# Patient Record
Sex: FEMALE | Race: WHITE
[De-identification: names, ages, dates, MRNs, and addresses within clinical notes are randomized per-mention and may not be internally consistent; named-entity substitution may affect disease eponyms.]

---

## 2017-07-10 ENCOUNTER — HOSPITAL ENCOUNTER (EMERGENCY)
Dept: HOSPITAL 59 - ER | Age: 37
Discharge: HOME | End: 2017-07-10
Payer: SELF-PAY

## 2017-07-10 ENCOUNTER — HOSPITAL ENCOUNTER (INPATIENT)
Dept: HOSPITAL 59 - ER | Age: 37
LOS: 3 days | Discharge: HOME | DRG: 382 | End: 2017-07-13
Attending: FAMILY MEDICINE | Admitting: FAMILY MEDICINE
Payer: SELF-PAY

## 2017-07-10 DIAGNOSIS — K29.50: ICD-10-CM

## 2017-07-10 DIAGNOSIS — F12.90: ICD-10-CM

## 2017-07-10 DIAGNOSIS — R10.13: ICD-10-CM

## 2017-07-10 DIAGNOSIS — R19.7: ICD-10-CM

## 2017-07-10 DIAGNOSIS — K22.10: Primary | ICD-10-CM

## 2017-07-10 DIAGNOSIS — F17.200: ICD-10-CM

## 2017-07-10 DIAGNOSIS — R11.2: Primary | ICD-10-CM

## 2017-07-10 LAB
ALBUMIN SERPL-MCNC: 3.9 GM/DL (ref 3.5–5)
ALBUMIN SERPL-MCNC: 4.6 GM/DL (ref 3.5–5)
ALBUMIN/GLOB SERPL: 1.6 {RATIO} (ref 1.1–1.8)
ALP SERPL-CCNC: 47 U/L (ref 38–126)
ALP SERPL-CCNC: 61 U/L (ref 38–126)
ALT SERPL-CCNC: 31 U/L (ref 9–52)
ALT SERPL-CCNC: 41 U/L (ref 9–52)
ANION GAP SERPL CALC-SCNC: 10.9 MMOL/L (ref 7–16)
ANION GAP SERPL CALC-SCNC: 15.7 MMOL/L (ref 7–16)
APPEARANCE UR: (no result)
AST SERPL-CCNC: 31 U/L (ref 14–36)
AST SERPL-CCNC: 46 U/L (ref 14–36)
BACTERIA #/AREA URNS HPF: (no result) /[HPF]
BASOPHILS NFR BLD: 0.1 % (ref 0–6)
BILIRUB DIRECT SERPL-MCNC: 0 MG/DL (ref 0–0.3)
BILIRUB SERPL-MCNC: 1.67 MG/DL (ref 0.2–1.3)
BILIRUB SERPL-MCNC: 1.81 MG/DL (ref 0.2–1.3)
BILIRUB UR-MCNC: (no result) MG/DL
BUN SERPL-MCNC: 12 MG/DL (ref 7–17)
BUN SERPL-MCNC: 14 MG/DL (ref 7–17)
CO2 SERPL-SCNC: 23.1 MMOL/L (ref 22–30)
CO2 SERPL-SCNC: 25.3 MMOL/L (ref 22–30)
COLOR UR: (no result)
CREAT SERPL-MCNC: 0.7 MG/DL (ref 0.52–1.04)
CREAT SERPL-MCNC: 0.9 MG/DL (ref 0.52–1.04)
EOSINOPHIL NFR BLD: 0.2 % (ref 0–6)
ERYTHROCYTE [DISTWIDTH] IN BLOOD BY AUTOMATED COUNT: 13.1 % (ref 11.5–14.5)
ERYTHROCYTE [DISTWIDTH] IN BLOOD BY AUTOMATED COUNT: 13.3 % (ref 11.5–14.5)
EST GLOMERULAR FILTRATION RATE: > 60 ML/MIN
EST GLOMERULAR FILTRATION RATE: > 60 ML/MIN
GLOBULIN SER-MCNC: 2.5 GM/DL (ref 1.4–4.8)
GLUCOSE SERPL-MCNC: 110 MG/DL (ref 70–110)
GLUCOSE SERPL-MCNC: 92 MG/DL (ref 70–110)
GLUCOSE UR STRIP-MCNC: NEGATIVE MG/DL
GRANULOCYTES NFR BLD: 78.1 % (ref 47–80)
HCG,QUALITATIVE URINE: NEGATIVE
HCT VFR BLD CALC: 41.7 % (ref 35–47)
HCT VFR BLD CALC: 47.1 % (ref 35–47)
HGB BLD-MCNC: 13.9 GM/DL (ref 11.6–16)
HGB BLD-MCNC: 15.8 GM/DL (ref 11.6–16)
KETONES UR QL STRIP: (no result)
LIPASE SERPL-CCNC: 120 U/L (ref 23–300)
LIPASE SERPL-CCNC: 219 U/L (ref 23–300)
LYMPHOCYTES NFR BLD AUTO: 13 % (ref 16–45)
LYMPHOCYTES NFR BLD: 9 % (ref 16–45)
MCH RBC QN AUTO: 33.4 PG (ref 27–33)
MCH RBC QN AUTO: 33.5 PG (ref 27–33)
MCHC RBC AUTO-ENTMCNC: 33.3 G/DL (ref 32–36)
MCHC RBC AUTO-ENTMCNC: 33.5 G/DL (ref 32–36)
MCV RBC AUTO: 100.2 FL (ref 81–97)
MCV RBC AUTO: 99.8 FL (ref 81–97)
MONOCYTES NFR BLD: 8.6 % (ref 0–9)
MONOCYTES NFR BLD: 9 % (ref 0–9)
MUCOUS THREADS URNS QL MICRO: (no result)
NEUTROPHILS NFR BLD AUTO: 82 % (ref 47–80)
NITRITE UR QL STRIP: NEGATIVE
PLATELET # BLD: 290 K/UL (ref 130–400)
PLATELET # BLD: 313 K/UL (ref 130–400)
PMV BLD AUTO: 9.6 FL (ref 7.4–10.4)
PMV BLD AUTO: 9.7 FL (ref 7.4–10.4)
PROT SERPL-MCNC: 6.4 GM/DL (ref 6.3–8.2)
PROT SERPL-MCNC: 7.7 GM/DL (ref 6.3–8.2)
PROT UR QL STRIP: (no result)
RBC # BLD AUTO: 4.16 M/UL (ref 3.8–5.4)
RBC # BLD AUTO: 4.72 M/UL (ref 3.8–5.4)
RBC # UR STRIP: (no result) /UL
URINE LEUKOCYTE ESTERASE: NEGATIVE
UROBILINOGEN UR STRIP-ACNC: 1 E.U./DL (ref 0.2–1)
WBC # BLD AUTO: 14.6 K/UL (ref 4.2–12.2)
WBC # BLD AUTO: 17.2 K/UL (ref 4.2–12.2)
WBC #/AREA URNS HPF: (no result) /[HPF]

## 2017-07-10 PROCEDURE — 80076 HEPATIC FUNCTION PANEL: CPT

## 2017-07-10 PROCEDURE — 96375 TX/PRO/DX INJ NEW DRUG ADDON: CPT

## 2017-07-10 PROCEDURE — 83690 ASSAY OF LIPASE: CPT

## 2017-07-10 PROCEDURE — 85025 COMPLETE CBC W/AUTO DIFF WBC: CPT

## 2017-07-10 PROCEDURE — 99223 1ST HOSP IP/OBS HIGH 75: CPT

## 2017-07-10 PROCEDURE — 99233 SBSQ HOSP IP/OBS HIGH 50: CPT

## 2017-07-10 PROCEDURE — 80048 BASIC METABOLIC PNL TOTAL CA: CPT

## 2017-07-10 PROCEDURE — 96374 THER/PROPH/DIAG INJ IV PUSH: CPT

## 2017-07-10 PROCEDURE — 85027 COMPLETE CBC AUTOMATED: CPT

## 2017-07-10 PROCEDURE — 74176 CT ABD & PELVIS W/O CONTRAST: CPT

## 2017-07-10 PROCEDURE — 81001 URINALYSIS AUTO W/SCOPE: CPT

## 2017-07-10 PROCEDURE — 96376 TX/PRO/DX INJ SAME DRUG ADON: CPT

## 2017-07-10 PROCEDURE — 96361 HYDRATE IV INFUSION ADD-ON: CPT

## 2017-07-10 PROCEDURE — 99285 EMERGENCY DEPT VISIT HI MDM: CPT

## 2017-07-10 PROCEDURE — 93005 ELECTROCARDIOGRAM TRACING: CPT

## 2017-07-10 PROCEDURE — 0DB48ZX EXCISION OF ESOPHAGOGASTRIC JUNCTION, VIA NATURAL OR ARTIFICIAL OPENING ENDOSCOPIC, DIAGNOSTIC: ICD-10-PCS | Performed by: INTERNAL MEDICINE

## 2017-07-10 PROCEDURE — 93010 ELECTROCARDIOGRAM REPORT: CPT

## 2017-07-10 PROCEDURE — 81025 URINE PREGNANCY TEST: CPT

## 2017-07-10 PROCEDURE — 99284 EMERGENCY DEPT VISIT MOD MDM: CPT

## 2017-07-10 PROCEDURE — 99239 HOSP IP/OBS DSCHRG MGMT >30: CPT

## 2017-07-10 PROCEDURE — 80053 COMPREHEN METABOLIC PANEL: CPT

## 2017-07-10 RX ADMIN — ONDANSETRON PRN MG: 2 INJECTION INTRAMUSCULAR; INTRAVENOUS at 20:53

## 2017-07-10 RX ADMIN — HYDROMORPHONE HYDROCHLORIDE PRN MG: 1 INJECTION, SOLUTION INTRAMUSCULAR; INTRAVENOUS; SUBCUTANEOUS at 18:55

## 2017-07-10 RX ADMIN — PANTOPRAZOLE SODIUM ONE MG: 40 INJECTION, POWDER, FOR SOLUTION INTRAVENOUS at 17:23

## 2017-07-10 RX ADMIN — HYDROMORPHONE HYDROCHLORIDE PRN MG: 1 INJECTION, SOLUTION INTRAMUSCULAR; INTRAVENOUS; SUBCUTANEOUS at 22:57

## 2017-07-10 NOTE — EMERGENCY DEPARTMENT RECORD
History of Present Illness





- General


Chief complaint: Nausea, Vomiting, Diarrhea


Stated complaint: VOMITING


Time Seen by Provider: 07/10/17 07:21


Source: Patient


Mode of Arrival: EMS


Limitations: No limitations





- History of Present Illness


Initial comments: 


The patient is here due to a 4 day hx of frequent nausea, vomiting, and upper 

abdominal pain. The vomiting is intermittent and is better now. The pain waxes 

and wanes and she denies any lower abdominal pain, diarrhea, vaginal issues or 

dysuria. The patient denies vomiting any blood and has a long hx of the same 

issues. She has been evaluated multiple times in the last 12 years for the same 

issues and no dx has been given. She states she gets this same problem a few 

times a year and  her only abdominal surgery is a BTL. The patient has a long 

hx of alcohol abuse and was drinking heavily prior to the onset of the pain. 

She denies any alcohol intake for the last 4 days and is currently having a 

menstrual period.





MD complaint: Nausea, Vomiting


Onset/Timin


-: Days(s)


Description of Vomiting: Bilious, Watery


Associated Abdominal Pain: Yes


Location: Diffuse, Epigastric


Radiation: None


Severity: Mild


Severity scale (1-10): 10


Consistency: Constant


Improves with: None


Worsens with: None


Context: Alcohol abuse





- Related Data


 Home Medications











 Medication  Instructions  Recorded  Confirmed  Last Taken


 


Omeprazole Magnesium [Prilosec Otc] 20 mg PO DAILY 07/10/17 07/10/17 06/30/17








 Previous Rx's











 Medication  Instructions  Recorded


 


Ondansetron [Zofran Odt] 4 mg SL .Q4-6H PRN #12 tab.rapdis 07/10/17











 Allergies











Allergy/AdvReac Type Severity Reaction Status Date / Time


 


cefaclor [From Ceclor] Allergy Severe HIVES Verified 07/10/17 07:18














Travel Screening





- Travel/Exposure Within Last 30 Days


Have you traveled within the last 30 days?: No





- Travel/Exposure Within Last Year


Have you traveled outside the [place] in the last year?: No





- Additonal Travel Details


Have you been exposed to anyone with a communicable illness?: No





- Travel Symptoms


Symptom Screening: None





Review of Systems


Constitutional: Denies: Chills, Fever


Eyes: Denies: Eye discharge


ENT: Denies: Congestion


Respiratory: Denies: Cough, Dyspnea


Cardiovascular: Denies: Arrhythmia, Chest pain





Past Medical History





- SOCIAL HISTORY


Smoking Status: Current every day smoker


Alcohol Use: Occassional


Drug Use: Occassional


Drug Use Detail:: Marijuana





- RESPIRATORY


Hx Respiratory Disorders: No





- CARDIOVASCULAR


Hx Cardio Disorders: No





- NEURO


Hx Neuro Disorders: No





- GI


Hx Reflux: Yes


Hx Nausea/Vomiting: Yes


Comment:: hx of a tiffanie wise tear





- 


Hx Genitourinary Disorders: No





- ENDOCRINE


Hx Endocrine Disorders: No





- MUSCULOSKELETAL


Hx Musculoskeletal Disorders: No





- PSYCH


Hx Psych Problems: No





- HEMATOLOGY/ONCOLOGY


Hx Hematology/Oncology Disorders: No





Family Medical History


Any Significant Family History?: Yes





Physical Exam





- General


General Appearance: Alert, Oriented x3, Cooperative, No acute distress





- Head


Head exam: Atraumatic, Normocephalic, Normal inspection





- Eye


Eye exam: Normal appearance, PERRL





- ENT


Throat exam: Normal inspection.  negative: Tonsillar erythema, Tonsillar exudate





- Neck


Neck exam: Normal inspection, Full ROM.  negative: Lymphadenopathy, Tenderness





- Respiratory


Respiratory exam: Normal lung sounds bilaterally.  negative: Respiratory 

distress





- Cardiovascular


Cardiovascular Exam: Regular rate, Normal rhythm, Normal heart sounds





- GI/Abdominal


GI/Abdominal exam: Soft, Normal bowel sounds, Tenderness (There is mild 

epigastric tenderness.).  negative: Distended, Guarding, Rebound, Rigid





- Extremities


Extremities exam: Normal inspection, Full ROM, Normal capillary refill.  

negative: Tenderness





- Neurological


Neurological exam: Alert, Normal gait.  negative: Abnormal gait, Motor sensory 

deficit





Course





 Vital Signs











  07/10/17





  07:12


 


Temperature 98.9 F


 


Pulse Rate 79


 


Respiratory 22





Rate 


 


Blood Pressure 122/70


 


Pulse Ox 97














- Reevaluation(s)


Reevaluation #1: 


The patient is doing much better. She is resting comfortably with much less 

nausea and pain.


07/10/17 08:02





Reevaluation #2: 


The patient is doing much better at this time. She denies any nausea, vomiting 

or ANY abdominal pain. She is resting comfortably and has no abdominal 

tenderness on exam.


07/10/17 08:24





Reevaluation #3: 


The patient is doing much better at this time. She denies any AP, nausea, or 

vomiting. I did explain her CT results to her and the need for F/U with her PCP 

and for possibly to obtain a GI doctor referral for an EGD.


07/10/17 10:54








Medical Decision Making





- Data Complexity


MDM Data: Labs Ordered and/or Reviewed, X-Ray Ordered and/or Reviewed





- Lab Data


Result diagrams: 


 07/10/17 07:10





 07/10/17 07:10





- Radiology Data


Radiology results: Report reviewed (CT: No acute abnormality. Possible distal 

esophageal wall thickening.)





Disposition


Disposition: Discharge


Clinical Impression: 


Vomiting


Qualifiers:


 Vomiting type: unspecified Vomiting Intractability: non-intractable Nausea 

presence: with nausea Qualified Code(s): R11.2 - Nausea with vomiting, 

unspecified





Disposition: Home, Self-Care


Condition: (1) Good


Instructions:  Acute Nausea and Vomiting (ED)


Additional Instructions: 


Please use the Zofran for nausea and do not drink any alcohol. Please see your 

PCP later this week for recheck and to discuss the need for a GI doctor 

referral. Please return to the ER for any worsening of your chronic GI symptoms.


Prescriptions: 


Ondansetron [Zofran Odt] 4 mg SL .Q4-6H PRN #12 tab.rapdis


 PRN Reason: Nausea


Forms:  Patient Portal Access


Time of Disposition: 10:57





Quality





- Quality Measures


Quality Measures: N/A





- Blood Pressure Screening


View Details: Yes


Blood Pressure Classification: Hypertensive Reading


Systolic Measurement: 142


Diastolic Measurement: 69


Screening for High Blood Pressure: < Pre-Hypertensive BP, F/U Documented > [

]


Pre-Hypertensive Follow-up Interventions: Follow-up with rescreen every year.

## 2017-07-10 NOTE — EMERGENCY DEPARTMENT RECORD
History of Present Illness





- General


Chief Complaint: Abdominal Pain


Stated Complaint: ABD PAIN


Time Seen by Provider: 07/10/17 16:59


Source: Patient


Mode of Arrival: EMS


Limitations: No limitations





- History of Present Illness


Initial Comments: 


The patient is here due to recurrent nausea, vomiting, and abdominal pain. The 

pain started 4 days ago after a bout of heavy alcohol intake. She then has had 

the vomiting, pain and nausea since. She was in the ER 10 hours ago with the 

same thing and received 2 liters of IVF and had a neg abdominal CT. She did 

also receive pain meds, Zofran and Carafate and was completely better at 

discharge. She states the pain returned over the last few hours with vomiting 

and anxiety. There has been no fever, back pain, or lower abdominal pain.





MD Complaint: Abdominal pain


Onset/Timin


-: Days(s)


Location: Diffuse


Severity: Moderate


Quality: Aching


Consistency: Constant





- Related Data


LMP Date: 07/10/17


 Home Medications











 Medication  Instructions  Recorded  Confirmed  Last Taken


 


Omeprazole Magnesium [Prilosec Otc] 20 mg PO DAILY 07/10/17 07/10/17 1 Day Ago





    ~17








 Previous Rx's











 Medication  Instructions  Recorded


 


Ondansetron [Zofran Odt] 4 mg SL .Q4-6H PRN #12 tab.rapdis 07/10/17











 Allergies











Allergy/AdvReac Type Severity Reaction Status Date / Time


 


cefaclor [From Ceclor] Allergy Severe HIVES Verified 07/10/17 16:59














Travel Screening





- Travel/Exposure Within Last 30 Days


Have you traveled within the last 30 days?: No





- Travel/Exposure Within Last Year


Have you traveled outside the [place] in the last year?: No





- Additonal Travel Details


Have you been exposed to anyone with a communicable illness?: No





- Travel Symptoms


Symptom Screening: None





Review of Systems


Constitutional: Denies: Chills, Fever


Eyes: Denies: Eye discharge


ENT: Denies: Congestion


Respiratory: Denies: Cough, Dyspnea





Past Medical History





- SOCIAL HISTORY


Smoking Status: Current every day smoker


Alcohol Use: None


Drug Use: Occassional


Drug Use Detail:: Marijuana





- RESPIRATORY


Hx Respiratory Disorders: No





- CARDIOVASCULAR


Hx Cardio Disorders: No





- NEURO


Hx Neuro Disorders: No





- GI


Hx GI Disorders: No


Hx Reflux: Yes


Hx Nausea/Vomiting: Yes


Comment:: hx of a tiffanie wise tear





- 


Hx Genitourinary Disorders: No





- ENDOCRINE


Hx Endocrine Disorders: No





- MUSCULOSKELETAL


Hx Musculoskeletal Disorders: No





- PSYCH


Hx Psych Problems: No





- HEMATOLOGY/ONCOLOGY


Hx Hematology/Oncology Disorders: No





Family Medical History


Any Significant Family History?: Yes





Physical Exam





- General


General Appearance: Alert, Oriented x3, Cooperative, No acute distress





- Head


Head exam: Atraumatic, Normocephalic, Normal inspection





- Eye


Eye exam: Normal appearance, PERRL





- Neck


Neck exam: Normal inspection, Full ROM.  negative: Tenderness





- Respiratory


Respiratory exam: Normal lung sounds bilaterally





- Cardiovascular


Cardiovascular Exam: Regular rate, Normal rhythm, Normal heart sounds





- GI/Abdominal


GI/Abdominal exam: Soft, Normal bowel sounds.  negative: Distended, Hypoactive 

bowel sounds, Rebound, Rigid, Tenderness





- Extremities


Extremities exam: Normal inspection, Full ROM, Normal capillary refill.  

negative: Tenderness





- Back


Back exam: Denies: Normal inspection, Vertebral tenderness





- Neurological


Neurological exam: Alert, Normal gait.  negative: Abnormal gait, Motor sensory 

deficit





- Psychiatric


Psychiatric exam: Anxious.  negative: Agitated, Depressed, Flat affect





Course





 Vital Signs











  07/10/17





  16:54


 


Temperature 98.5 F


 


Pulse Rate 73


 


Respiratory 18





Rate 


 


Blood Pressure 161/85


 


Pulse Ox 100














- Reevaluation(s)


Reevaluation #1: 


The patient is doing much better now after her medicines. She is much more 

relaxed and denies any pain or nausea.


07/10/17 17:29





Reevaluation #2: 


The patient is doing much better at this time. She denies any AP and her 

abdomen is very soft and nontender in all 4 quads. Because of the recurrent 

nature of her problems I did recommend hospital admission and she agrees. I 

then did discuss the case with Dr. Cook and he agrees to the plan.


07/10/17 18:05








Medical Decision Making





- Data Complexity


MDM Data: Labs Ordered and/or Reviewed, EKG Ordered and/or Reviewed





- Lab Data


Result diagrams: 


 07/10/17 17:10





 07/10/17 17:10





- EKG Data


-: EKG Interpreted by Me


EKG: No Acute Changes, Normal EKG





Disposition


Disposition: Admit


Clinical Impression: 


Intractable vomiting with nausea


Qualifiers:


 Vomiting type: cyclical vomiting Qualified Code(s): G43.A1 - Cyclical vomiting

, intractable





Disposition: Still a Patient at [facility]


Decision to Admit: Admit from ER


Decision to Admit Date: 07/10/17


Decision to Admit Time: 18:06


Accepting Physician: Joyce


Time Discussed w/Accepting Physician: 18:06


Condition: (2) Stable


Forms:  Patient Portal Access


Time of Disposition: 18:06





Quality





- Quality Measures


Quality Measures: N/A





- Blood Pressure Screening


Blood Pressure Classification: Pre-Hypertensive BP Reading


Systolic Measurement: 161


Diastolic Measurement: 85


Screening for High Blood Pressure: < Pre-Hypertensive BP, F/U Documented > [

]


Pre-Hypertensive Follow-up Interventions: Follow-up with rescreen every year.

## 2017-07-11 LAB
ALBUMIN SERPL-MCNC: 3.6 GM/DL (ref 3.5–5)
ALBUMIN/GLOB SERPL: 1.6 {RATIO} (ref 1.1–1.8)
ALP SERPL-CCNC: 46 U/L (ref 38–126)
ALT SERPL-CCNC: 50 U/L (ref 9–52)
ANION GAP SERPL CALC-SCNC: 10.4 MMOL/L (ref 7–16)
AST SERPL-CCNC: 27 U/L (ref 14–36)
BASOPHILS NFR BLD: 0.2 % (ref 0–6)
BILIRUB SERPL-MCNC: 1.31 MG/DL (ref 0.2–1.3)
BUN SERPL-MCNC: 8 MG/DL (ref 7–17)
CO2 SERPL-SCNC: 24.6 MMOL/L (ref 22–30)
CREAT SERPL-MCNC: 0.8 MG/DL (ref 0.52–1.04)
EOSINOPHIL NFR BLD: 0.5 % (ref 0–6)
ERYTHROCYTE [DISTWIDTH] IN BLOOD BY AUTOMATED COUNT: 13.2 % (ref 11.5–14.5)
EST GLOMERULAR FILTRATION RATE: > 60 ML/MIN
GLOBULIN SER-MCNC: 2.3 GM/DL (ref 1.4–4.8)
GLUCOSE SERPL-MCNC: 101 MG/DL (ref 70–110)
GRANULOCYTES NFR BLD: 69.1 % (ref 47–80)
HCT VFR BLD CALC: 41.5 % (ref 35–47)
HGB BLD-MCNC: 13.5 GM/DL (ref 11.6–16)
LIPASE SERPL-CCNC: 145 U/L (ref 23–300)
LYMPHOCYTES NFR BLD AUTO: 20.6 % (ref 16–45)
MCH RBC QN AUTO: 33.1 PG (ref 27–33)
MCHC RBC AUTO-ENTMCNC: 32.5 G/DL (ref 32–36)
MCV RBC AUTO: 101.7 FL (ref 81–97)
MONOCYTES NFR BLD: 9.6 % (ref 0–9)
PLATELET # BLD: 260 K/UL (ref 130–400)
PMV BLD AUTO: 9.2 FL (ref 7.4–10.4)
PROT SERPL-MCNC: 5.9 GM/DL (ref 6.3–8.2)
RBC # BLD AUTO: 4.08 M/UL (ref 3.8–5.4)
WBC # BLD AUTO: 10.1 K/UL (ref 4.2–12.2)

## 2017-07-11 RX ADMIN — HYDROMORPHONE HYDROCHLORIDE PRN MG: 1 INJECTION, SOLUTION INTRAMUSCULAR; INTRAVENOUS; SUBCUTANEOUS at 07:07

## 2017-07-11 RX ADMIN — ONDANSETRON PRN MG: 2 INJECTION INTRAMUSCULAR; INTRAVENOUS at 14:28

## 2017-07-11 RX ADMIN — HYDROMORPHONE HYDROCHLORIDE PRN MG: 1 INJECTION, SOLUTION INTRAMUSCULAR; INTRAVENOUS; SUBCUTANEOUS at 02:55

## 2017-07-11 RX ADMIN — ONDANSETRON PRN MG: 2 INJECTION INTRAMUSCULAR; INTRAVENOUS at 02:20

## 2017-07-11 RX ADMIN — HYDROMORPHONE HYDROCHLORIDE PRN MG: 1 INJECTION, SOLUTION INTRAMUSCULAR; INTRAVENOUS; SUBCUTANEOUS at 20:13

## 2017-07-11 RX ADMIN — ONDANSETRON PRN MG: 2 INJECTION INTRAMUSCULAR; INTRAVENOUS at 10:23

## 2017-07-11 RX ADMIN — ONDANSETRON PRN MG: 2 INJECTION INTRAMUSCULAR; INTRAVENOUS at 06:16

## 2017-07-11 RX ADMIN — PANTOPRAZOLE SODIUM ONE MG: 40 INJECTION, POWDER, FOR SOLUTION INTRAVENOUS at 12:06

## 2017-07-11 RX ADMIN — SUCRALFATE SCH: 1 SUSPENSION ORAL at 20:23

## 2017-07-11 RX ADMIN — HYDROMORPHONE HYDROCHLORIDE PRN MG: 1 INJECTION, SOLUTION INTRAMUSCULAR; INTRAVENOUS; SUBCUTANEOUS at 12:04

## 2017-07-11 RX ADMIN — SUCRALFATE SCH G: 1 SUSPENSION ORAL at 12:06

## 2017-07-11 RX ADMIN — HYDROMORPHONE HYDROCHLORIDE PRN MG: 1 INJECTION, SOLUTION INTRAMUSCULAR; INTRAVENOUS; SUBCUTANEOUS at 16:14

## 2017-07-11 RX ADMIN — ONDANSETRON PRN MG: 2 INJECTION INTRAMUSCULAR; INTRAVENOUS at 20:19

## 2017-07-11 RX ADMIN — SUCRALFATE SCH G: 1 SUSPENSION ORAL at 14:30

## 2017-07-11 NOTE — CT SCAN REPORT
EXAM:  CT OF THE ABDOMEN AND PELVIS WITHOUT CONTRAST 



HISTORY:  UPPER ABDOMINAL PAIN WITH VOMITING.



TECHNIQUE:  Helical CT examination of the abdomen and pelvis was performed 
without oral or intravenous contrast administration.  Lack of oral and IV 
contrast utilization limits evaluation of the bowel and solid viscera 
respectively.  



Comparison:  None.  



FINDINGS:  The lung bases are clear and there is no pleural or pericardial 
effusion.  The heart is not enlarged.  The wall of the distal esophagus appears 
mildly prominent in thickness.  While this likely just relates to incomplete 
distention, a mucosal abnormality cannot be entirely excluded.  



Lack of IV contrast utilization limits evaluation of the solid viscera.  



No suspicious focal abnormality is demonstrated within the liver, spleen, 
pancreas, left adrenal gland, nor kidneys.  A small hypodense mass is 
demonstrated within the right adrenal gland measuring 10 x 7 mm.  This has a 
density of 5 Hounsfield units and is consistent with a small adenoma.  



The gallbladder is unremarkable and no biliary ductal dilatation is seen.  



No intraabdominal nor retroperitoneal lymphadenopathy is identified.  



The vasculature is unremarkable.  



No pelvic mass, lymphadenopathy, or free pelvic fluid is seen.  The uterus is 
near the midline.  A few follicles are suggested within the right ovary.  The 
ovaries are not enlarged.  No intrinsic urinary bladder abnormalities are 
identified though evaluation is limited by lack of distention.  



No gross bowel dilatation nor bowel wall thickening is seen.  The appendix is 
visualized and normal in appearance.  There may be a few diverticula within the 
left colon without evidence of diverticulitis.  No free intraperitoneal air.  



A small fat filled umbilical hernia is present measuring 2.1 x 1.6 cm.  



No lytic or blastic bone lesion is seen.  There are mild degenerative changes 
scattered within the visualized spine.  



IMPRESSION:  

1.  THE WALL OF THE DISTAL ESOPHAGUS APPEARS MILDLY PROMINENT IN THICKNESS.  
WHILE THIS LIKELY RELATES TO INCOMPLETE DISTENTION, MUCOSAL ABNORMALITY CANNOT 
BE ENTIRELY EXCLUDED.  



2.  NO CONVINCING CT EVIDENCE OF AN ACUTE INTRAABDOMINAL NOR INTRAPELVIC 
PROCESS THOUGH EVALUATION IS LIMITED BY LACK OF ORAL AND IV CONTRAST 
UTILIZATION.  



3.  NORMAL APPENDIX.  



4.  SMALL UNCOMPLICATED FAT FILLED UMBILICAL HERNIA.  



5.  OCCASIONAL DIVERTICULA QUESTIONED IN THE LEFT COLON WITHOUT EVIDENCE OF 
DIVERTICULITIS.  



6.  SMALL HYPODENSE NODULE/MASS IN THE RIGHT ADRENAL GLAND CONSISTENT WITH 
LIPID RICH ADENOMA.  



JOB NUMBER:  [id number]
MTDD

## 2017-07-11 NOTE — HISTORY & PHYSICAL
History of Present Illness





- Date of Service


Date of Service for History & Physical: 07/11/17





- History of Present Illness


Admitting Diagnosis: 1. Intractable vomiting with abdominal pain.


History of Present Illness: 


37 yo female admitted w/ CC of epigastric pain.  PMHx of abdominal pain, smoking

, h/o alcohol abuse, anxiety.





Patient presented to our ED yesterday morning for abdominal pain.  CTA 

relatively unremarkable aside from potentially thickened esophagus.  She was 

given 2 L's of IVFs, 1 mg of Dilaudid, 8 mg of Zofran and 1 gm of Carafate.  

Patient d/c'd encouraged to follow up with her PCP for referral to GI.  Patient 

presented back to our ED around 3 pm and was admitted.  





This morning, patient is lying in bed sleeping.  When awoken,  patient became 

very anxious that she was being discharged.  Patient then began to c/o extreme 

epigastric pain.  Requesting her pain medication.  Pain described as sharp, 

stabbing, burning.  Rated 5/10 in severity.  Aggravated by etoh use.  States 

she drank multiple margaritas on Friday, which exacerbated her pain.  She 

admits to etoh abuse daily since July 4th.  Unable to tell me how much she's 

been drinking every day though states she's been drinking liquor.  Pain 

alleviated by 0.5 mg of Dilaudid, zofran, NPO and rest.  Associated symptoms 

include nausea, anxiety, acid reflux.  She has not vomited during her 

admission.  She denies any vaginal d/c, pain with urination, hemautria, change 

in bowel habits, back pain, black or bloody stool, blood in vomit, or fever/

chills.  Patient reports similar hospitalizations for these exact same 

symptoms.  States she hasn't been hospitalized in the past year for them, 

however.  Denies drug use.  Abd surgeries include BTL.  Long h/o acid reflux- 

can only afford to take omeprazole 20 mg prn. 








Travel Screening





- Travel/Exposure Within Last 30 Days


Have you traveled within the last 30 days?: No





- Travel/Exposure Within Last Year


Have you traveled outside the [place] in the last year?: No





- Additonal Travel Details


Have you been exposed to anyone with a communicable illness?: No





- Travel Symptoms


Symptom Screening: None





Review of Systems


Constitutional: Denies: Chills, Fever


Eyes: Denies: Eye discharge


ENT: Denies: Congestion


Respiratory: Denies: Cough, Dyspnea


Cardiovascular: Denies: Chest pain, Dyspnea on exertion, Edema, Palpitations


Gastrointestinal: Reports: Abdominal pain (epigastrium ), Nausea.  Denies: 

Constipation, Diarrhea, Hematemesis, Hematochezia, Melena, Vomiting


Genitourinary: Denies: Abnormal menses, Dysuria, Hematuria, Urgency


Musculoskeletal: Denies: Back pain, Myalgia


Skin: Denies: Change in color


Neurological: Denies: Abnormal gait, Confusion


Psychiatric: Reports: Anxiety.  Denies: Depression


Hematological/Lymphatic: Denies: Easy bleeding





Past Medical History





- SOCIAL HISTORY


Smoking Status: Current every day smoker


Alcohol Use: Heavy


Alcohol Use Comment: daily 7/4/17-7/7/17 until abdominal pain started on 7/7/17


Drug Use: Occassional


Drug Use Detail:: Marijuana





- RESPIRATORY


Hx Respiratory Disorders: No





- CARDIOVASCULAR


Hx Cardio Disorders: No





- NEURO


Hx Neuro Disorders: No





- GI


Hx GI Disorders: No


Hx Reflux: Yes


Hx Nausea/Vomiting: Yes


Comment:: hx of a tiffanie wise tear with hyperemesis gravidarum





- 


Hx Genitourinary Disorders: No





- ENDOCRINE


Hx Endocrine Disorders: No





- MUSCULOSKELETAL


Hx Musculoskeletal Disorders: No





- PSYCH


Hx Psych Problems: No





- HEMATOLOGY/ONCOLOGY


Hx Hematology/Oncology Disorders: No





Family Medical History


Any Significant Family History?: Yes





H&P Meds/Allergies





- Allergies


Allergies: 


 Allergies











Allergy/AdvReac Type Severity Reaction Status Date / Time


 


cefaclor [From Ceclor] Allergy Severe HIVES Verified 07/10/17 16:59














- Home Medications


 Home Medications











 Medication  Instructions  Recorded  Confirmed  Last Taken


 


Omeprazole Magnesium [Prilosec Otc] 20 mg PO DAILY 07/10/17 07/10/17 1 Day Ago





    ~07/09/17








 Previous Rx's











 Medication  Instructions  Recorded


 


Ondansetron [Zofran Odt] 4 mg SL .Q4-6H PRN #12 tab.rapdis 07/10/17














- Active Medications


Active Medications: 


 Current Medications





Diphenhydramine HCl (Benadryl Capsule)  50 mg PO QHS PRN


   PRN Reason: INSOMNIA


   Last Admin: 07/10/17 21:56 Dose:  50 mg


Diphenhydramine HCl (Benadryl Iv)  50 mg IVP QHS PRN


   PRN Reason: SLEEP


Hydromorphone HCl (Dilaudid)  0.5 mg IVP Q4H PRN


   PRN Reason: Abdominal Pain


   Last Admin: 07/11/17 07:07 Dose:  0.5 mg


Ondansetron HCl (Zofran)  4 mg IVP Q4H PRN


   PRN Reason: NAUSEA


   Last Admin: 07/11/17 10:23 Dose:  4 mg


Pantoprazole Sodium (Protonix Iv)  40 mg IV BID JUANCARLOS


Sucralfate (Carafate)  1 g PO QID JUANCARLOS











Physical Exam





- Vital Signs


Vital Signs: 


 Vital Signs - Last 24 Hrs











  Temp Pulse Resp BP Pulse Ox


 


 07/11/17 08:23  98.1 F  98 H  20  109/74  98


 


 07/11/17 06:00  98.5 F  55 L  18  156/87  96


 


 07/10/17 23:00  98.6 F  54 L  16  130/73  94 L


 


 07/10/17 20:46  98.3 F  50 L  16  150/81  98














- General


General Appearance: Alert, Oriented x3, Cooperative, No acute distress


Limitations: No limitations





- Head


Head exam: Atraumatic, Normocephalic, Normal inspection





- Eye


Eye exam: Normal appearance, PERRL





- ENT


ENT exam: Normal exam


Ear exam: Normal external inspection


Nasal Exam: Normal inspection


Mouth exam: Normal external inspection





- Neck


Neck exam: Normal inspection, Full ROM.  negative: Tenderness





- Respiratory


Respiratory exam: Normal lung sounds bilaterally





- Cardiovascular


Cardiovascular Exam: Regular rate, Normal rhythm, Normal heart sounds





- GI/Abdominal


GI/Abdominal exam: Soft, Normal bowel sounds, Tenderness (epigastrium).  

negative: Diminished bowel sounds, Distended, Guarding, Hyperactive bowel sounds

, Hypoactive bowel sounds, Rebound, Rigid





- Rectal


Rectal exam: Deferred





- 


 exam: Deferred





- Extremities


Extremities exam: Normal inspection, Full ROM, Normal capillary refill.  

negative: Tenderness





- Back


Back exam: Denies: Normal inspection, Vertebral tenderness





- Neurological


Neurological exam: Alert, Normal gait.  negative: Abnormal gait, Motor sensory 

deficit





- Psychiatric


Psychiatric exam: Anxious.  negative: Agitated, Depressed, Flat affect





Results





- Labs


Result Diagrams: 


 07/11/17 06:05





 07/11/17 06:05


Labs Last 24 Hours: 


 Laboratory Results - last 24 hr











  07/11/17 07/11/17





  06:05 06:05


 


WBC  10.1 


 


RBC  4.08 


 


Hgb  13.5 


 


Hct  41.5 


 


MCV  101.7 H 


 


MCH  33.1 H 


 


MCHC  32.5 


 


RDW  13.2 


 


Plt Count  260 


 


MPV  9.2 


 


Gran %  69.1 


 


Lymphocytes %  20.6 


 


Monocytes %  9.6 H 


 


Eosinophils %  0.5 


 


Basophils %  0.2 


 


Sodium   144


 


Potassium   4.1


 


Chloride   109 H


 


Carbon Dioxide   24.6


 


Anion Gap   10.4


 


BUN   8


 


Creatinine   0.8


 


Estimated GFR   > 60


 


Random Glucose   101


 


Calcium   8.0 L


 


Total Bilirubin   1.31 H


 


AST   27


 


ALT   50


 


Alkaline Phosphatase   46


 


Total Protein   5.9 L


 


Albumin   3.6


 


Globulin   2.3


 


Albumin/Globulin Ratio   1.6


 


Lipase   145














VTE H&P Assessment





- Risk for VTE


Risk for VTE: Yes


Risk Level: Very Low


Risk Assessment Date: 07/11/17


Risk Assessment Time: 10:00


VTE Orders Placed or Will Be Placed: Yes





Plan





- Detailed Diagnosis and Plan


(1) Epigastric pain


Current Visit: Yes   Status: Acute   Base Code: R10.13 - EPIGASTRIC PAIN   

Comment: 7/11/17: gastritis vs. gastroparesis vs. other?


- CTA potential thickened esophagus, o/w relatively unreamrkable.  WBC normal.  

Afebrile.


- normal lipase.  ALT/AST normalized.  Bilirubin trending down (i suspect 

elevated due to etoh abuse)


- Protonix 40 mg BID, carafate 1 gm QID


- Dilaudid 0.5 mg Q4 hours PRN (will trial transition to PO pain medications 

later today)


- anti emetics (IV Zofran PRN)


- Will continue to hold IVF's if patient tolerates PO liquids.


- CLD


- monitor for vomiting, blood in stool, hematemesis.


- patient needs family physician and likely GI referral.   





(2) Acid reflux


Current Visit: Yes   Status: Acute   Base Code: K21.9 - GASTRO-ESOPHAGEAL 

REFLUX DISEASE WITHOUT ESOPHAGITIS   Comment: 7/11/17: will increase protonix 

to 40 mg IV BID and add carafate 1 gm QID.  Clear liquids as tolerated.   





(3) DVT prophylaxis


Current Visit: Yes   Status: Acute   Base Code: IIH3107 -    Comment: 7/11/17: 

low risk- decreased mobility.  ambulating through the room.  SCDs WIB.   





(4) Full code status


Current Visit: Yes   Status: Acute   Base Code: Z78.9 - OTHER SPECIFIED HEALTH 

STATUS   Comment: 7/11/17: she will remain full code

## 2017-07-12 RX ADMIN — SUCRALFATE SCH G: 1 SUSPENSION ORAL at 14:06

## 2017-07-12 RX ADMIN — ONDANSETRON PRN MG: 2 INJECTION INTRAMUSCULAR; INTRAVENOUS at 00:30

## 2017-07-12 RX ADMIN — ONDANSETRON PRN MG: 2 INJECTION INTRAMUSCULAR; INTRAVENOUS at 04:15

## 2017-07-12 RX ADMIN — ONDANSETRON PRN MG: 2 INJECTION INTRAMUSCULAR; INTRAVENOUS at 21:16

## 2017-07-12 RX ADMIN — HYDROMORPHONE HYDROCHLORIDE PRN MG: 1 INJECTION, SOLUTION INTRAMUSCULAR; INTRAVENOUS; SUBCUTANEOUS at 12:40

## 2017-07-12 RX ADMIN — SUCRALFATE SCH G: 1 SUSPENSION ORAL at 09:32

## 2017-07-12 RX ADMIN — HYDROMORPHONE HYDROCHLORIDE PRN MG: 1 INJECTION, SOLUTION INTRAMUSCULAR; INTRAVENOUS; SUBCUTANEOUS at 00:35

## 2017-07-12 RX ADMIN — HYDROMORPHONE HYDROCHLORIDE PRN MG: 1 INJECTION, SOLUTION INTRAMUSCULAR; INTRAVENOUS; SUBCUTANEOUS at 16:57

## 2017-07-12 RX ADMIN — HYDROMORPHONE HYDROCHLORIDE PRN MG: 1 INJECTION, SOLUTION INTRAMUSCULAR; INTRAVENOUS; SUBCUTANEOUS at 08:29

## 2017-07-12 RX ADMIN — PANTOPRAZOLE SODIUM SCH MG: 40 INJECTION, POWDER, FOR SOLUTION INTRAVENOUS at 00:33

## 2017-07-12 RX ADMIN — SUCRALFATE SCH G: 1 SUSPENSION ORAL at 00:41

## 2017-07-12 RX ADMIN — PANTOPRAZOLE SODIUM SCH MG: 40 INJECTION, POWDER, FOR SOLUTION INTRAVENOUS at 21:16

## 2017-07-12 RX ADMIN — ONDANSETRON PRN MG: 2 INJECTION INTRAMUSCULAR; INTRAVENOUS at 08:29

## 2017-07-12 RX ADMIN — ONDANSETRON PRN MG: 2 INJECTION INTRAMUSCULAR; INTRAVENOUS at 12:41

## 2017-07-12 RX ADMIN — HYDROMORPHONE HYDROCHLORIDE PRN MG: 1 INJECTION, SOLUTION INTRAMUSCULAR; INTRAVENOUS; SUBCUTANEOUS at 21:17

## 2017-07-12 RX ADMIN — SUCRALFATE SCH G: 1 SUSPENSION ORAL at 17:06

## 2017-07-12 RX ADMIN — SUCRALFATE SCH G: 1 SUSPENSION ORAL at 21:16

## 2017-07-12 RX ADMIN — PANTOPRAZOLE SODIUM SCH MG: 40 INJECTION, POWDER, FOR SOLUTION INTRAVENOUS at 09:33

## 2017-07-12 RX ADMIN — HYDROMORPHONE HYDROCHLORIDE PRN MG: 1 INJECTION, SOLUTION INTRAMUSCULAR; INTRAVENOUS; SUBCUTANEOUS at 04:16

## 2017-07-12 RX ADMIN — ONDANSETRON PRN MG: 2 INJECTION INTRAMUSCULAR; INTRAVENOUS at 16:56

## 2017-07-12 NOTE — PHYSICIAN PROGRESS NOTE
Subjective





- Date


Date of Physician Progress Note: 07/12/17





- Subjective


Subjective Comment: 


lying in bed.  states she feels much better than yesterday.  trialled ice chips

, however threw it up this morning.  she's starting to have more of an 

appetite.  epigastric pain continues, though significantly improved.  admits to 

a lot of stress at home.  has slept most of the morning.  went about 5 hours 

without IV pain medications.  nausea improved.  no diarrhea/constipation. 








Objective





- Vital Signs


Vital Signs: 





 Vital Signs - Last 24 Hrs











  Temp Pulse Resp BP BP Pulse Ox


 


 07/12/17 07:59   54 L  16   


 


 07/12/17 07:58  99.0 F  54 L  16   159/91  97


 


 07/12/17 04:25  99.1 F  50 L  16   174/84  97


 


 07/11/17 18:58    20   


 


 07/11/17 18:00      141/78 


 


 07/11/17 14:47  97 F L    190/96  


 


 07/11/17 14:00   97 H  20   190/96 














- General


General Appearance: Alert, Oriented x3, Cooperative, No acute distress


Limitations: No limitations





- Head


Head exam: Atraumatic, Normocephalic, Normal inspection





- Eye


Eye exam: Normal appearance, PERRL





- ENT


ENT exam: Normal exam


Ear exam: Normal external inspection


Nasal Exam: Normal inspection


Mouth exam: Normal external inspection





- Neck


Neck exam: Normal inspection, Full ROM.  negative: Tenderness





- Respiratory


Respiratory exam: Normal lung sounds bilaterally





- Cardiovascular


Cardiovascular Exam: Regular rate, Normal rhythm, Normal heart sounds





- GI/Abdominal


GI/Abdominal exam: Soft, Normal bowel sounds, Tenderness (epigastrium, improved)

.  negative: Diminished bowel sounds, Distended, Guarding, Hyperactive bowel 

sounds, Hypoactive bowel sounds, Rebound, Rigid





- Rectal


Rectal exam: Deferred





- 


 exam: Deferred





- Extremities


Extremities exam: Normal inspection, Full ROM, Normal capillary refill.  

negative: Tenderness





- Back


Back exam: Denies: Normal inspection, Vertebral tenderness





- Neurological


Neurological exam: Alert, Normal gait.  negative: Abnormal gait, Motor sensory 

deficit





- Psychiatric


Psychiatric exam: Anxious.  negative: Agitated, Depressed, Flat affect





Assessment and Plan





- Assessment and Plan


(1) Epigastric pain


Current Visit: Yes   Status: Acute   Base Code: R10.13 - EPIGASTRIC PAIN   

Comment: 7/12/17: gastritis vs. gastroparesis vs. other?


- CTA potential thickened esophagus, o/w relatively unreamrkable.  WBC normal.  

Afebrile.


- normal lipase.  ALT/AST normalized.  Bilirubin trending down (i suspect 

elevated due to etoh abuse)


- Protonix 40 mg BID, carafate 1 gm QID


- Dilaudid 0.5 mg Q4 hours PRN- I will, once again, trial transition to PO pain 

medications later today.


- anti emetics (IV Zofran PRN)


- Will continue to hold IVF's if patient tolerates PO liquids.


- CLD


- monitor for vomiting, blood in stool, hematemesis.


- patient needs family physician


- GI referral placed   





(2) Acid reflux


Current Visit: Yes   Status: Acute   Base Code: K21.9 - GASTRO-ESOPHAGEAL 

REFLUX DISEASE WITHOUT ESOPHAGITIS   Comment: 7/12/17: continue protonix to 40 

mg IV BID and carafate 1 gm QID.  Clear liquids as tolerated.   





(3) DVT prophylaxis


Current Visit: Yes   Status: Acute   Base Code: [id number] -    Comment: 7/12/17: 

low risk- decreased mobility.  ambulating through the room.  SCDs WIB.   





(4) Full code status


Current Visit: Yes   Status: Acute   Base Code: Z78.9 - OTHER SPECIFIED HEALTH 

STATUS   Comment: 7/12/17: she will remain full code   





Results





- Labs


Result Diagrams: 


 07/11/17 06:05





 07/11/17 06:05





DVT/PE Assessment





- Risk for VTE


Risk for VTE: No


Risk Level: Very Low


Risk Assessment Date: 07/11/17


Risk Assessment Time: 10:00


VTE Orders Placed or Will Be Placed: Yes





- Active Medicaitons


Current Medications: 





 Current Medications





Diphenhydramine HCl (Benadryl Capsule)  50 mg PO QHS PRN


   PRN Reason: INSOMNIA


   Last Admin: 07/10/17 21:56 Dose:  50 mg


Diphenhydramine HCl (Benadryl Iv)  50 mg IVP QHS PRN


   PRN Reason: SLEEP


Hydromorphone HCl (Dilaudid)  0.5 mg IVP Q4H PRN


   PRN Reason: Abdominal Pain


   Last Admin: 07/12/17 12:40 Dose:  0.5 mg


Meclizine HCl (Antivert)  25 mg PO Q8H PRN


   PRN Reason: Dizziness


Ondansetron HCl (Zofran)  4 mg IVP Q4H PRN


   PRN Reason: NAUSEA


   Last Admin: 07/12/17 12:41 Dose:  4 mg


Pantoprazole Sodium (Protonix Iv)  40 mg IV BID [facility]


   Last Admin: 07/12/17 09:33 Dose:  40 mg


Sucralfate (Carafate)  1 g PO QID [facility]


   Last Admin: 07/12/17 09:32 Dose:  1 g











AMI Plan





- Labs


Result Diagrams: 


 07/11/17 06:05





 07/11/17 06:05

## 2017-07-13 RX ADMIN — HYDROMORPHONE HYDROCHLORIDE PRN MG: 1 INJECTION, SOLUTION INTRAMUSCULAR; INTRAVENOUS; SUBCUTANEOUS at 01:22

## 2017-07-13 RX ADMIN — SUCRALFATE SCH: 1 SUSPENSION ORAL at 15:38

## 2017-07-13 RX ADMIN — HYDROMORPHONE HYDROCHLORIDE PRN MG: 1 INJECTION, SOLUTION INTRAMUSCULAR; INTRAVENOUS; SUBCUTANEOUS at 07:06

## 2017-07-13 RX ADMIN — SUCRALFATE SCH: 1 SUSPENSION ORAL at 11:10

## 2017-07-13 RX ADMIN — ONDANSETRON PRN MG: 2 INJECTION INTRAMUSCULAR; INTRAVENOUS at 07:06

## 2017-07-13 RX ADMIN — PANTOPRAZOLE SODIUM SCH MG: 40 INJECTION, POWDER, FOR SOLUTION INTRAVENOUS at 11:20

## 2017-07-13 RX ADMIN — ONDANSETRON PRN MG: 2 INJECTION INTRAMUSCULAR; INTRAVENOUS at 01:24

## 2017-07-13 NOTE — DISCHARGE SUMMARY
Providers


Discharge Summary Date: 07/13/17


Date of admission: 


07/10/17 18:20





Expected Date of Discharge: 07/13/17


Attending physician: 


SUZY MCBRIDE





Consults: 


Consult Orders





07/12/17 10:36


Consult NOW 


   Consulting Provider: BELINDA OSMAN


   Physician Instructions: 


   Reason For Exam: n/v, epigastric pain, potential esophagitis














Physical Exam





- Vital Signs


Vital Signs: 


 Vital Signs - Last 24 Hrs











  Temp Pulse Resp BP Pulse Ox


 


 07/13/17 09:00  97.7 F  58 L  16  132/77  96


 


 07/13/17 01:30  98.9 F  59 L  16  138/77  99


 


 07/12/17 18:52  98.7 F  58 L  14  158/78  97


 


 07/12/17 15:00  98.7 F  53 L  14  151/98  96


 


 07/12/17 11:00  98.8 F  58 L  14  148/90  96














- General


General Appearance: Alert, Oriented x3, Cooperative, No acute distress


Limitations: No limitations





- Head


Head exam: Atraumatic, Normocephalic, Normal inspection





- Eye


Eye exam: Normal appearance, PERRL





- ENT


ENT exam: Normal exam


Ear exam: Normal external inspection


Nasal Exam: Normal inspection


Mouth exam: Normal external inspection





- Neck


Neck exam: Normal inspection, Full ROM.  negative: Tenderness





- Respiratory


Respiratory exam: Normal lung sounds bilaterally





- Cardiovascular


Cardiovascular Exam: Regular rate, Normal rhythm, Normal heart sounds





- GI/Abdominal


GI/Abdominal exam: Soft, Normal bowel sounds.  negative: Diminished bowel sounds

, Distended, Guarding, Hyperactive bowel sounds, Hypoactive bowel sounds, 

Rebound, Rigid, Tenderness





- Rectal


Rectal exam: Deferred





- 


 exam: Deferred





- Extremities


Extremities exam: Normal inspection, Full ROM, Normal capillary refill.  

negative: Tenderness





- Back


Back exam: Denies: Normal inspection, Vertebral tenderness





- Neurological


Neurological exam: Alert, Normal gait.  negative: Abnormal gait, Motor sensory 

deficit





- Psychiatric


Psychiatric exam: Anxious.  negative: Agitated, Depressed, Flat affect





Hospitalization





- Hospitalization


Admission Diagnosis: 1. Intractable vomiting with abdominal pain.





- Problem List/Discharge Diagnosis


(1) Epigastric pain


Current Visit: Yes   Status: Acute   Base Code: R10.13 - EPIGASTRIC PAIN   

Comment: 7/13/17: esophagitis vs. gastritis vs. gastroparesis vs. other?


- CTA potential thickened esophagus, o/w relatively unreamrkable.  WBC normal.  

Afebrile.


- normal lipase.  ALT/AST normalized.  Bilirubin trending down (i suspect 

elevated due to etoh abuse)


- EGD 7/13/17: esophgeal ulcers, repeat EGD recommended in 6-8 weeks. Contact 

Dr. Osman's office at [phone] re scheduling this.


- Continue PPI 40 mg BID, carafate 1 gm QID PRN.  These have been sent to 

pharmacy


- anti emetics (PO Zofran 4 mg TID prn) PRN


- acid reflux diet


- establish care with family doctor


- return as needed


   





(2) Acid reflux


Current Visit: Yes   Status: Acute   Base Code: K21.9 - GASTRO-ESOPHAGEAL 

REFLUX DISEASE WITHOUT ESOPHAGITIS   Comment: 7/13/17: continue ppi therapy 40 

mg PO BID and carafate 1 gm QID prn.     





(3) Full code status


Current Visit: Yes   Status: Acute   Base Code: Z78.9 - OTHER SPECIFIED HEALTH 

STATUS   Comment: 7/13/17: pt remained full code   





- Hospitalization Course


Disposition: Home, Self-Care


Hospital Course: 


37 yo female admitted w/ CC of epigastric pain.  PMHx of abdominal pain, smoking

, h/o alcohol abuse, anxiety.





Patient presented to our ED yesterday morning for abdominal pain.  CTA 

relatively unremarkable aside from potentially thickened esophagus.  She was 

given 2 L's of IVFs, 1 mg of Dilaudid, 8 mg of Zofran and 1 gm of Carafate.  

Patient d/c'd encouraged to follow up with her PCP for referral to GI.  Patient 

presented back to our ED around 3 pm and was admitted.  





This morning, patient is lying in bed sleeping.  When awoken,  patient became 

very anxious that she was being discharged.  Patient then began to c/o extreme 

epigastric pain.  Requesting her pain medication.  Pain described as sharp, 

stabbing, burning.  Rated 5/10 in severity.  Aggravated by etoh use.  States 

she drank multiple margaritas on Friday, which exacerbated her pain.  She 

admits to etoh abuse daily since July 4th.  Unable to tell me how much she's 

been drinking every day though states she's been drinking liquor.  Pain 

alleviated by 0.5 mg of Dilaudid, zofran, NPO and rest.  Associated symptoms 

include nausea, anxiety, acid reflux.  She has not vomited during her 

admission.  She denies any vaginal d/c, pain with urination, hemautria, change 

in bowel habits, back pain, black or bloody stool, blood in vomit, or fever/

chills.  Patient reports similar hospitalizations for these exact same 

symptoms.  States she hasn't been hospitalized in the past year for them, 

however.  Denies drug use.  Abd surgeries include BTL.  Long h/o acid reflux- 

can only afford to take omeprazole 20 mg prn. 





7/13/17: patient lying in bed comfortably.  states she feels much better than 

she has the last few days.  she has not required any IV pain medications over 

the past 6 hours, which is an improvement.  she tolerated liquids prior to 

midnight.  Scheduled for EGD later today.  no new concerns.





Abnormal Labs: 


 Abnormal Lab Results











  07/11/17 07/11/17 Range/Units





  06:05 06:05 


 


MCV  101.7 H   (81-97)  fl


 


MCH  33.1 H   (27-33)  pg


 


Monocytes %  9.6 H   (0-9)  %


 


Chloride   109 H  ()  mmol/L


 


Calcium   8.0 L  (8.5-10.1)  mg/dL


 


Total Bilirubin   1.31 H  (0.2-1.3)  mg/dL


 


Total Protein   5.9 L  (6.3-8.2)  gm/dL











Condition at Discharge: (2) Stable





Discharge Medications





- Discharge Medications


Prescriptions: 


Omeprazole 40 mg PO BID #60 capsule.


Sucralfate [Carafate] 1 g PO QID PRN #30 


 PRN Reason: Heartburn


Home Medications: 


 Ambulatory Orders





Omeprazole 40 mg PO BID #60 capsule. 07/13/17 [Last Taken Unknown]


Ondansetron [Zofran Odt] 4 mg SL TID PRN #12 tab.rapdis 07/13/17 [Last Taken 1 

Day Ago ~07/09/17]


Sucralfate [Carafate] 1 g PO QID PRN #30  07/13/17 [Last Taken Unknown]











Discharge Plan





- Discharge Instructions


Activity at Discharge: Increase Activity as Tolerated


Diet at Discharge: Other (low acid diet)


Additional Instructions: 


Please start taking Omeprazole 40 mg twice daily 30 minutes prior to a meal.





Follow low acid diet.





[place] [facility] [place]  [place] [phone].





Zofran as needed for nausea.





Avoid ibuprofen, aleve, advil, motrin.





Establish care with primary physician within the next 1-2 weeks.





return as needed

## 2017-07-18 NOTE — MEDICAL RECORDS CONSULT
DATE OF CONSULTATION: 07/13/2017



REASON FOR CONSULTATION: Epigastric pain, nausea, vomiting. 



HISTORY OF PRESENT ILLNESS: The patient is a 36-year-old woman who has developed recurrent nausea 
and vomiting, epigastric pain. This began several days ago. She has had recurrent bouts of this for 
many years. She had this during possibly as well as post pregnancy. She has had repeated bouts of 
nausea and vomiting. She has been told that they thought she might have cyclic nausea and vomiting 
and perhaps even may have marijuana-induced cyclic nausea/vomiting syndrome. However, this has been 
somewhat unclear. She denies any melena, hematochezia, constipation, or diarrhea. She does admit to 
great deals of stress at home. She has not used any regular medication other than omeprazole and 
previously had used some sublingual Zofran. 



PAST MEDICAL HISTORY: Unremarkable. 



PAST SURGICAL HISTORY: Noncontributory. 



FAMILY HISTORY: Noncontributory. 



ALLERGIES: CECLOR.



SOCIAL HISTORY: She does smoke cigarettes, has used alcohol heavily in the past and also uses 
marijuana several days per week. 



HOME MEDICATIONS: 

1. Omeprazole. 

2. Zofran. 



REVIEW OF SYSTEMS: Noted per the admission H&P and the emergency room chart. No additions otherwise 
noted in the History of Present Illness. 



PHYSICAL EXAMINATION: 

VITAL SIGNS: Pulse 100, she is afebrile, respirations 20, blood pressure 105/58, saturation 99%. 

GENERAL: She is awake, alert, oriented x3, nontoxic in appearance. Appears to be in no acute 
distress. 

HEENT: Head is normocephalic and atraumatic. No temporal muscle wasting was noted. Skin was warm and 
dry and not jaundiced. Extraocular muscles intact. No conjunctival injection or scleral icterus 
appreciable. 

NECK: Supple. Trachea is midline. Thyroid nonpalpable. 

HEART: Regular rate and rhythm without murmur. 

LUNGS: Coarse breath sounds diffusely. No wheezing, rhonchi, or rales were noted. Normal to 
percussion. 

ABDOMEN: Soft. Positive bowel sounds. Mild epigastric palpation tenderness. There is no 
hepatosplenomegaly. There is no guarding, rebound, or rigidity noted. 

EXTREMITIES: No clubbing, cyanosis, or edema. 



LABORATORY DATA: White count 10.1, hemoglobin 13.5, hematocrit 41.5, platelets 260. Sodium 144, 
potassium 4.1, chloride 109, CO2 24.6, BUN 8, creatinine 0.8, total bilirubin 1.3, AST 27, ALT 50, 
alkaline phosphatase 46, albumin 3.6, lipase 145. 



CTA of the abdomen was performed which was unremarkable other than potentially thickened esophagus. 



IMPRESSION: Recurrent epigastric pain with associated nausea and vomiting. This may be related to a 
functional disorder versus alcohol induced and/or cyclic nausea/vomiting syndrome which may be 
exacerbated by the chronic marijuana use. 



RECOMMENDATION: We will proceed with an upper endoscopy to further evaluate the thickening of the 
esophagus seen on CTA as well as the recurrent nausea, vomiting, epigastric discomfort. Further 
recommendations will be forthcoming once endoscopy results are available. 



As always, thank you for allowing me to participate in the healthcare of your patients. 



CC: CHAPIN Arellano Dr.

## 2017-11-20 ENCOUNTER — HOSPITAL ENCOUNTER (OUTPATIENT)
Dept: HOSPITAL 59 - ER | Age: 37
Setting detail: OBSERVATION
LOS: 1 days | Discharge: HOME | End: 2017-11-21
Attending: INTERNAL MEDICINE | Admitting: INTERNAL MEDICINE
Payer: SELF-PAY

## 2017-11-20 DIAGNOSIS — K21.9: ICD-10-CM

## 2017-11-20 DIAGNOSIS — G43.A1: ICD-10-CM

## 2017-11-20 DIAGNOSIS — F12.188: Primary | ICD-10-CM

## 2017-11-20 DIAGNOSIS — Z72.0: ICD-10-CM

## 2017-11-20 DIAGNOSIS — G47.9: ICD-10-CM

## 2017-11-20 DIAGNOSIS — F41.9: ICD-10-CM

## 2017-11-20 LAB
ALBUMIN SERPL-MCNC: 3.9 G/DL (ref 4–5)
ALBUMIN/GLOB SERPL: 1.6 {RATIO} (ref 1.1–1.8)
ALP SERPL-CCNC: 46 U/L (ref 35–104)
ALT SERPL-CCNC: 16 U/L (ref ?–33)
ANION GAP SERPL CALC-SCNC: 17 MMOL/L (ref 7–16)
APPEARANCE UR: CLEAR
AST SERPL-CCNC: 13 U/L (ref 10–35)
BACTERIA #/AREA URNS HPF: (no result) /[HPF]
BARBITURATE SCREEN URINE: NOT DETECTED
BASOPHILS NFR BLD: 0.2 % (ref 0–6)
BENZODIAZEPINE SCREEN URINE: NOT DETECTED
BILIRUB SERPL-MCNC: 1.1 MG/DL (ref 0.2–1)
BILIRUB UR-MCNC: NEGATIVE MG/DL
BUN SERPL-MCNC: 6 MG/DL (ref 6–20)
CARDIOLIPIN IGM SER IA-ACNC: NOT DETECTED
CARDIOLIPIN IGM SER IA-ACNC: NOT DETECTED
CO2 SERPL-SCNC: 23 MMOL/L (ref 22–29)
COLOR UR: YELLOW
CREAT SERPL-MCNC: 0.7 MG/DL (ref 0.5–0.9)
EOSINOPHIL NFR BLD: 0.3 % (ref 0–6)
ERYTHROCYTE [DISTWIDTH] IN BLOOD BY AUTOMATED COUNT: 12.6 % (ref 11.5–14.5)
EST GLOMERULAR FILTRATION RATE: > 60 ML/MIN
ETHANOL SERPL-MCNC: 0 G/DL (ref 0–0.01)
GLOBULIN SER-MCNC: 2.4 GM/DL (ref 1.4–4.8)
GLUCOSE SERPL-MCNC: 131 MG/DL (ref 74–109)
GLUCOSE UR STRIP-MCNC: NEGATIVE MG/DL
GRANULOCYTES NFR BLD: 75.8 % (ref 47–80)
HCT VFR BLD CALC: 45.1 % (ref 35–47)
HGB BLD-MCNC: 14.9 GM/DL (ref 11.6–16)
KETONES UR QL STRIP: (no result)
LIPASE SERPL-CCNC: 38 U/L (ref 13–60)
LYMPHOCYTES NFR BLD AUTO: 18.2 % (ref 16–45)
MCH RBC QN AUTO: 31.9 PG (ref 27–33)
MCHC RBC AUTO-ENTMCNC: 33 G/DL (ref 32–36)
MCV RBC AUTO: 96.6 FL (ref 81–97)
METHADONE SCREEN URINE: NOT DETECTED
MONOCYTES NFR BLD: 5.5 % (ref 0–9)
NITRITE UR QL STRIP: NEGATIVE
OPIATE SCREEN URINE: NOT DETECTED
PF4 HEPARIN CMPLX AB SER-ACNC: NOT DETECTED
PF4 HEPARIN CMPLX AB SER-ACNC: NOT DETECTED
PHENCYCLIDINE SCREEN URINE: NOT DETECTED
PLATELET # BLD: 289 K/UL (ref 130–400)
PMV BLD AUTO: 9.6 FL (ref 7.4–10.4)
PROPOXYPHENE SCREEN URINE: NOT DETECTED
PROT SERPL-MCNC: 6.3 G/DL (ref 6.6–8.7)
PROT UR QL STRIP: NEGATIVE
RBC # BLD AUTO: 4.67 M/UL (ref 3.8–5.4)
RBC # UR STRIP: (no result) /UL
RBC #/AREA URNS HPF: (no result) /[HPF]
THC SCREEN URINE: DETECTED
TRICYCLIC ANTIDEPRESSANT SCRN: NOT DETECTED
URINE LEUKOCYTE ESTERASE: NEGATIVE
UROBILINOGEN UR STRIP-ACNC: 1 E.U./DL (ref 0.2–1)
WBC # BLD AUTO: 12 K/UL (ref 4.2–12.2)
WBC #/AREA URNS HPF: (no result) /[HPF]

## 2017-11-20 PROCEDURE — 96368 THER/DIAG CONCURRENT INF: CPT

## 2017-11-20 PROCEDURE — 84703 CHORIONIC GONADOTROPIN ASSAY: CPT

## 2017-11-20 PROCEDURE — 99220: CPT

## 2017-11-20 PROCEDURE — 80305 DRUG TEST PRSMV DIR OPT OBS: CPT

## 2017-11-20 PROCEDURE — 99217: CPT

## 2017-11-20 PROCEDURE — 96365 THER/PROPH/DIAG IV INF INIT: CPT

## 2017-11-20 PROCEDURE — 96375 TX/PRO/DX INJ NEW DRUG ADDON: CPT

## 2017-11-20 PROCEDURE — 99285 EMERGENCY DEPT VISIT HI MDM: CPT

## 2017-11-20 PROCEDURE — 83690 ASSAY OF LIPASE: CPT

## 2017-11-20 PROCEDURE — 85025 COMPLETE CBC W/AUTO DIFF WBC: CPT

## 2017-11-20 PROCEDURE — 81001 URINALYSIS AUTO W/SCOPE: CPT

## 2017-11-20 PROCEDURE — 96361 HYDRATE IV INFUSION ADD-ON: CPT

## 2017-11-20 PROCEDURE — 80320 DRUG SCREEN QUANTALCOHOLS: CPT

## 2017-11-20 PROCEDURE — 96366 THER/PROPH/DIAG IV INF ADDON: CPT

## 2017-11-20 PROCEDURE — 80053 COMPREHEN METABOLIC PANEL: CPT

## 2017-11-20 RX ADMIN — PANTOPRAZOLE SODIUM SCH: 40 INJECTION, POWDER, FOR SOLUTION INTRAVENOUS at 10:36

## 2017-11-20 RX ADMIN — SUCRALFATE SCH G: 1 SUSPENSION ORAL at 13:24

## 2017-11-20 RX ADMIN — SODIUM CHLORIDE SCH MLS/HR: 9 INJECTION, SOLUTION INTRAVENOUS at 10:40

## 2017-11-20 RX ADMIN — HYDROXYZINE PAMOATE PRN MG: 25 CAPSULE ORAL at 23:17

## 2017-11-20 RX ADMIN — SODIUM CHLORIDE SCH: 9 INJECTION, SOLUTION INTRAVENOUS at 23:24

## 2017-11-20 RX ADMIN — PROMETHAZINE HYDROCHLORIDE PRN MLS/HR: 25 INJECTION INTRAMUSCULAR; INTRAVENOUS at 19:28

## 2017-11-20 RX ADMIN — HYDROXYZINE PAMOATE PRN MG: 25 CAPSULE ORAL at 16:52

## 2017-11-20 RX ADMIN — ENOXAPARIN SODIUM SCH MG: 40 INJECTION SUBCUTANEOUS at 23:18

## 2017-11-20 RX ADMIN — SUCRALFATE SCH: 1 SUSPENSION ORAL at 19:00

## 2017-11-20 RX ADMIN — ONDANSETRON PRN MG: 2 INJECTION INTRAMUSCULAR; INTRAVENOUS at 12:32

## 2017-11-20 RX ADMIN — SODIUM CHLORIDE PRN MLS/HR: 0.9 INJECTION, SOLUTION INTRAVENOUS at 06:46

## 2017-11-20 RX ADMIN — LORAZEPAM PRN MG: 2 INJECTION INTRAMUSCULAR; INTRAVENOUS at 22:29

## 2017-11-20 RX ADMIN — SUCRALFATE SCH G: 1 SUSPENSION ORAL at 21:50

## 2017-11-20 RX ADMIN — ONDANSETRON PRN MG: 2 INJECTION INTRAMUSCULAR; INTRAVENOUS at 07:10

## 2017-11-20 RX ADMIN — SODIUM CHLORIDE SCH: 9 INJECTION, SOLUTION INTRAVENOUS at 05:22

## 2017-11-20 RX ADMIN — PANTOPRAZOLE SODIUM SCH MG: 40 INJECTION, POWDER, FOR SOLUTION INTRAVENOUS at 06:58

## 2017-11-20 RX ADMIN — SODIUM CHLORIDE SCH MLS/HR: 9 INJECTION, SOLUTION INTRAVENOUS at 16:53

## 2017-11-20 RX ADMIN — PROMETHAZINE HYDROCHLORIDE PRN MLS/HR: 25 INJECTION INTRAMUSCULAR; INTRAVENOUS at 10:40

## 2017-11-20 RX ADMIN — PANTOPRAZOLE SODIUM SCH MG: 40 INJECTION, POWDER, FOR SOLUTION INTRAVENOUS at 21:50

## 2017-11-20 RX ADMIN — ONDANSETRON PRN MG: 2 INJECTION INTRAMUSCULAR; INTRAVENOUS at 16:51

## 2017-11-20 RX ADMIN — SUCRALFATE SCH G: 1 SUSPENSION ORAL at 16:53

## 2017-11-20 NOTE — EMERGENCY DEPARTMENT RECORD
History of Present Illness





- General


Chief complaint: Vomiting


Stated complaint: VOMITING


Time Seen by Provider: 17 02:58


Source: Patient


Mode of Arrival: Ambulatory


Limitations: No limitations





- History of Present Illness


Initial comments: 





38 yo female presents to ED for evaluation of intermittent nausea and vomiting 

symptoms that began approximately 23 hours ago.  Patient reports a long history 

of cyclical vomiting syndrome, reports that cause of her symptoms is unknown.  

Patient has undergone numerous CT imaging studies that have been negative, 

reports that she has undergone EGD in July demonstrating esophagitis.  Patient 

denies fevers, chills, or recent illness but does report that she stopped her 

Prilosec 4 days ago and did smoke marijuana this morning.  


MD complaint: Nausea, Vomiting


Onset/Timin


-: Days(s)


Associated Abdominal Pain: Yes


Location: Epigastric


Radiation: Chest


Severity: Severe


Severity scale (1-10): 10


Consistency: Constant


Improves with: None


Worsens with: None


Context: Other


Associated Symptoms: Nausea/vomiting





- Related Data


 Allergies











Allergy/AdvReac Type Severity Reaction Status Date / Time


 


cefaclor [From Ceclor] Allergy Severe HIVES Verified 07/10/17 16:59














Travel Screening





- Travel/Exposure Within Last 30 Days


Have you traveled within the last 30 days?: No





Review of Systems


Constitutional: Denies: Chills, Fever, Malaise, Night sweats


Eyes: Denies: Eye discharge, Eye pain


ENT: Denies: Congestion, Ear pain, Epistaxis


Respiratory: Denies: Cough, Dyspnea


Cardiovascular: Denies: Chest pain, Dyspnea on exertion


Endocrine: Denies: Fatigue, Heat or cold intolerance


Gastrointestinal: Reports: Abdominal pain, Nausea, Vomiting.  Denies: 

Constipation


Genitourinary: Denies: Incontinence, Retention


Musculoskeletal: Denies: Arthralgia, Back pain, Gout, Joint swelling


Skin: Denies: Bruising, Change in color


Neurological: Denies: Abnormal gait, Confusion, Headache, Seizure


Psychiatric: Reports: Anxiety


Hematological/Lymphatic: Denies: Anemia, Blood Clots





Past Medical History





- SOCIAL HISTORY


Smoking Status: Current every day smoker


Alcohol Use: None


Alcohol Use Comment: quit in 


Drug Use: Heavy


Drug Use Detail:: Marijuana





- RESPIRATORY


Hx Respiratory Disorders: No





- CARDIOVASCULAR


Hx Cardio Disorders: No





- NEURO


Hx Neuro Disorders: No





- GI


Hx GI Disorders: Yes


Hx Reflux: Yes


Hx Nausea/Vomiting: Yes


Hx Ulcer: Yes (Gastric Ulcer 2017)


Comment:: hx of a tiffanie wise tear with hyperemesis gravidarum





- 


Hx Genitourinary Disorders: No





- ENDOCRINE


Hx Endocrine Disorders: No





- MUSCULOSKELETAL


Hx Musculoskeletal Disorders: No





- PSYCH


Hx Psych Problems: No





- HEMATOLOGY/ONCOLOGY


Hx Hematology/Oncology Disorders: No





Family Medical History


Any Significant Family History?: No





Physical Exam





- General


General Appearance: Alert, Oriented x3, Anxious, Other (Patient appears very 

anxious on examination, repeating "I need something for my pain" over and over, 

does exhibit dry heaves on examination without producing any emesis.)


Limitations: No limitations





- Head


Head exam: Atraumatic, Normocephalic, Normal inspection


Head exam detail: negative: Abrasion, Contusion, Cannon's sign, General 

tenderness, Hematoma, Laceration





- Eye


Eye exam: Normal appearance.  negative: Conjunctival injection, Periorbital 

swelling, Periorbital tenderness, Scleral icterus





- ENT


Ear exam: negative: Auricular hematoma, Auricular trauma


Nasal Exam: negative: Active bleeding, Discharge, Dried blood, Foreign body


Mouth exam: negative: Drooling, Laceration, Muffled voice, Tongue elevation





- Neck


Neck exam: Normal inspection.  negative: Meningismus, Tenderness





- Respiratory


Respiratory exam: Normal lung sounds bilaterally.  negative: Rales, Respiratory 

distress, Rhonchi, Stridor





- Cardiovascular


Cardiovascular Exam: Regular rate, Normal rhythm, Normal heart sounds





- GI/Abdominal


GI/Abdominal exam: Soft.  negative: Rebound, Rigid, Tenderness





- Rectal


Rectal exam: Deferred





- 


 exam: Deferred





- Extremities


Extremities exam: Normal inspection.  negative: Calf tenderness, Pedal edema, 

Tenderness





- Back


Back exam: Denies: CVA tenderness (R), CVA tenderness (L)





- Neurological


Neurological exam: Alert, Oriented X3





- Psychiatric


Psychiatric exam: Anxious





- Skin


Skin exam: Normal color.  negative: Abrasion


Type of lesion: negative: abrasion





Course





 Vital Signs











  17





  03:00


 


Pulse Rate 78


 


Respiratory 18





Rate 


 


Blood Pressure 130/97


 


Pulse Ox 99














- Reevaluation(s)


Reevaluation #1: 





17 03:16


Patient initially presents to ED via EMS, appears very calm on examination, 

however on examination appears very anxious, repeating that she "needs 

something for pain" over and over.  I calmed the patient and informed her that 

we would attempt to control her nausea symptoms, then administer GI cocktail 

for the esophageal burning in her chest.  I explained that I would be unable to 

"fix her" in 10 minutes and that a step by step approach to control her nausea 

and anxiety symptoms followed by a GI cocktail would take 30-45 minutes to 

administer.  Will observe closely in ED.  


Reevaluation #2: 





17 03:22


Previous records reviewed:


CT Abdomen and Pelvis 7/10/17


1. Thickening of the distal esophagus, likely due to under distension


2. No acute process


Reevaluation #3: 





17 03:35


Patient was reassessed, dry heaves have resolved for the moment.  Patient 

appears asleep following Valium administration, will administer IV Tylenol for 

her pain symptoms as well.


Reevaluation #4: 





17 03:43


Labs reviewed, AG 17, labs are otherwise grossly unremarkable with a normal 

Potassium and Bicarb level.  UA pending.


Reevaluation #5: 





17 04:06


Patient reassessed, sleeping on re-examination.  Patient's son awakened the 

patient, immediately began to moan and ask for more pain medication (IV Tylenol 

just completed infusing within 15 minutes).  Will admit for further evaluation 

with GI Consultation in the morning.





17 06:50


Case was discussed with Maday Jules, will accept admission for further 

evaluation of her nausea symptoms.





Medical Decision Making





- Lab Data


Result diagrams: 


 17 03:15





 17 03:15





Disposition


Disposition: Admit


Clinical Impression: 


 Intractable cyclical vomiting with nausea





Disposition: Still a Patient at [facility]


Decision to Admit: Admit from ER


Decision to Admit Date: 17


Decision to Admit Time: 04:09


Condition: (2) Stable


Time of Disposition: 04:09





Quality





- Quality Measures


Quality Measures: N/A





- Blood Pressure Screening


Does Patient Have Any of the Following: No


Blood Pressure Classification: Hypertensive Reading


Systolic Measurement: 144


Diastolic Measurement: 97


Screening for High Blood Pressure: < First Hypertensive BP, F/U Documented > [

]


First Hypertensive Follow-up Interventions: Referral to alternative/primary 

care provider.

## 2017-11-20 NOTE — HISTORY & PHYSICAL
History of Present Illness





- Date of Service


Date of Service for History & Physical: 11/20/17





- History of Present Illness


Admitting Diagnosis: Intractable nausea.  Cyclic vomiting by history


History of Present Illness: 





    Roshni is a 37 year-old female who presented to the ED on 11/20/17 because 

she was experiencing severe nausea and vomiting for 23 hours.  She states that 

she did vomit streaks of bright red blood before coming the ED.  She also 

complains of severe epigastric pain.  She states that she has episodes of 

nausea and vomiting every morning and that it usually resolves within an hour.  

She stopped taking her prilosec 4 days prior to the onset of her symptoms and 

she states that she has not used ETOH since 7/2017.  Her vital signs in ED were 

144/97, HR 88, RR 18, 98.2F, and 100% on room air.  She was treated with zofran 

and valium, which did help relieve her nausea and pain.  Her laboratory 

findings were unremarkable, except for slightly elevated anion gap of 17.0 and 

urine ketones of 80 mg/dl.  She was admitted to inpatient intractable nausea 

and vomitin and GI was consulted.    





    Patient has a history of cyclical vomiting syndrome with unknown cause.  

She was admitted from 7/10/17 through 7/13/17 for similar symptoms.  During 

that visit, she had a CT without contrast that showed a thickened distal 

esophagus and a small uncomplicated umbilical hernia.  She also had an EGD that 

showed severe ulcerative esophagitis.  A repeat EGD was recommended 6-8 weeks 

after to assess healing, but was never completed.  Other history includes daily 

smoker, acid reflux, anxiety, ETOH abuse, and marijuana use.  





11/20/17 1030-  Roshni is resting comfortably in bed.  She was in the shower 

for 3 hours this morning and again this afternoon and she states the warm water 

does help her nausea and pain.  She has mild epigastric pain, but no abdominal 

pain elsewhere.  She has not vomited since early this morning, but she has been 

dry heaving.  Her vital signs this morning were /101, HR 66, RR 18, T 

97.7F. 





PCP: (no current PCP)


MGI: Dr. Irving (from previous admission) 





Travel Screening





- Travel/Exposure Within Last 30 Days


Have you traveled within the last 30 days?: No





- Travel/Exposure Within Last Year


Have you traveled outside the [place] in the last year?: No





- Additonal Travel Details


Have you been exposed to anyone with a communicable illness?: No





- Travel Symptoms


Symptom Screening: None





Review of Systems


Constitutional: Denies: Chills, Fever, Malaise, Night sweats


Eyes: Denies: Eye discharge, Eye pain


ENT: Denies: Congestion, Ear pain, Epistaxis


Respiratory: Denies: Cough, Dyspnea


Cardiovascular: Denies: Chest pain, Dyspnea on exertion


Endocrine: Denies: Fatigue, Heat or cold intolerance


Gastrointestinal: Reports: Abdominal pain, Nausea, Vomiting.  Denies: 

Constipation


Genitourinary: Denies: Incontinence, Retention


Musculoskeletal: Denies: Arthralgia, Back pain, Gout, Joint swelling


Skin: Denies: Bruising, Change in color


Neurological: Denies: Abnormal gait, Confusion, Headache, Seizure


Psychiatric: Reports: Anxiety


Hematological/Lymphatic: Denies: Anemia, Blood Clots





Past Medical History





- SOCIAL HISTORY


Smoking Status: Current every day smoker


Alcohol Use: None


Alcohol Use Comment: quit in June


Drug Use: Heavy


Drug Use Detail:: Marijuana





- RESPIRATORY


Hx Respiratory Disorders: No





- CARDIOVASCULAR


Hx Cardio Disorders: No





- NEURO


Hx Neuro Disorders: No





- GI


Hx GI Disorders: Yes


Hx Abdominal Pain: Yes


Hx Reflux: Yes


Hx Nausea/Vomiting: Yes (cyclical vomiting syndrome)


Hx Ulcer: Yes (Gastric Ulcer 6/2017)


Comment:: hx of a tiffanie wise tear w/ hyperemesis gravidarum, ulcerative 

esophagitis





- 


Hx Genitourinary Disorders: No





- ENDOCRINE


Hx Endocrine Disorders: No





- MUSCULOSKELETAL


Hx Musculoskeletal Disorders: No





- PSYCH


Hx Psych Problems: No





- HEMATOLOGY/ONCOLOGY


Hx Hematology/Oncology Disorders: No





Family Medical History


Any Significant Family History?: No





H&P Meds/Allergies





- Allergies


Allergies: 


 Allergies











Allergy/AdvReac Type Severity Reaction Status Date / Time


 


cefaclor [From Ceclor] Allergy Severe HIVES Verified 07/10/17 16:59














- Active Medications


Active Medications: 


 Current Medications





Sodium Chloride ()  1,000 mls @ 125 mls/hr IV .Q8H PRN


   PRN Reason: LARGE VOLUME IV


   Last Admin: 11/20/17 06:46 Dose:  125 mls/hr


Acetaminophen (Ofirmev)  1,000 mg in 100 mls @ 400 mls/hr IVPB Q6H JUANCARLOS


   Last Admin: 11/20/17 05:22 Dose:  Not Given


Promethazine HCl 25 mg/ Sodium (Chloride)  101 mls @ 200 mls/hr IVPB Q6H PRN


   PRN Reason: NAUSEA/VOMITING


Ondansetron HCl (Zofran)  4 mg IVP Q4H PRN


   PRN Reason: NAUSEA


   Last Admin: 11/20/17 07:10 Dose:  4 mg


Pantoprazole Sodium (Protonix Iv)  40 mg IV DAILY JUANCARLOS


   Last Admin: 11/20/17 06:58 Dose:  40 mg











Physical Exam





- Vital Signs


Vital Signs: 


 Vital Signs - Last 24 Hrs











  Temp Pulse Resp BP Pulse Ox


 


 11/20/17 05:17  97.7 F  66  18  154/101  100


 


 11/20/17 04:55    16  














- General


General Appearance: Alert, Oriented x3, Cooperative, Mild distress, Anxious, 

Other (Patient appears very anxious on examination, repeating "I need something 

for my pain" over and over, does exhibit dry heaves on examination without 

producing any emesis.)


Limitations: No limitations





- Head


Head exam: Atraumatic, Normocephalic, Normal inspection


Head exam detail: negative: Abrasion, Contusion, Cannon's sign, General 

tenderness, Hematoma, Laceration





- Eye


Eye exam: Normal appearance.  negative: Conjunctival injection, Periorbital 

swelling, Periorbital tenderness, Scleral icterus





- ENT


Ear exam: negative: Auricular hematoma, Auricular trauma


Nasal Exam: negative: Active bleeding, Discharge, Dried blood, Foreign body


Mouth exam: negative: Drooling, Laceration, Muffled voice, Tongue elevation





- Neck


Neck exam: Normal inspection.  negative: Meningismus, Tenderness





- Respiratory


Respiratory exam: Normal lung sounds bilaterally.  negative: Rales, Respiratory 

distress, Rhonchi, Stridor





- Cardiovascular


Cardiovascular Exam: Regular rate, Normal rhythm, Normal heart sounds





- GI/Abdominal


GI/Abdominal exam: Soft, Normal bowel sounds, Tenderness.  negative: Rebound, 

Rigid (with palpation of epigastric area)





- Rectal


Rectal exam: Deferred





- 


 exam: Deferred





- Extremities


Extremities exam: Normal inspection.  negative: Calf tenderness, Pedal edema, 

Tenderness





- Back


Back exam: Denies: CVA tenderness (R), CVA tenderness (L)





- Neurological


Neurological exam: Alert, Oriented X3





- Psychiatric


Psychiatric exam: Anxious





- Skin


Skin exam: Normal color.  negative: Abrasion


Type of lesion: negative: abrasion





Results





- Labs


Result Diagrams: 


 11/20/17 03:15





 11/20/17 03:15


Labs Last 24 Hours: 


 Laboratory Results - last 24 hr











  11/20/17 11/20/17





  05:23 05:31


 


Urine Color  Yellow 


 


Urine Appearance  Clear 


 


Urine pH  6.5 


 


Ur Specific Gravity  1.020 


 


Urine Protein  Negative 


 


Urine Glucose (UA)  Negative 


 


Urine Ketones  80 mg/dl H 


 


Urine Blood  Trace-i 


 


Urine Nitrite  Negative 


 


Urine Bilirubin  Negative 


 


Urine Urobilinogen  1.0 


 


Ur Leukocyte Esterase  Negative 


 


Urine RBC  0 - 2 


 


Urine WBC  0 - 2 


 


Ur Epithelial Cells  7 - 10 


 


Urine Bacteria  Few 


 


Urine Opiates Screen   Not detected


 


Ur Oxycodone Screen   Not detected


 


Urine Methadone Screen   Not detected


 


Ur Propoxyphene Screen   Not detected


 


Ur Barbituates Screen   Not detected


 


Ur Tricyclics Screen   Not detected


 


Ur Phencyclidine Scrn   Not detected


 


Ur Amphetamine Screen   Not detected


 


U Methamphetamines Scrn   Not detected


 


U Benzodiazepines Scrn   Not detected


 


Urine Cocaine Screen   Not detected


 


Urine Cannabis Screen   Detected














VTE H&P Assessment





- Risk for VTE


Risk for VTE: Yes


Risk Level: Low


Risk Assessment Date: 11/20/17


Risk Assessment Time: 13:35


VTE Orders Placed or Will Be Placed: Yes





Plan





- Detailed Diagnosis and Plan


(1) Intractable cyclical vomiting with nausea


Current Visit: Yes   Status: Acute   Base Code: G43.A1 - CYCLICAL VOMITING, 

INTRACTABLE   Comment: 11/20/17-  Advanace diet to clear liquids, continue 0.9% 

NaCl at 125ml/hour, zofran 4mg IV q4 hours prn, and phenergen 25mg q6 hours.  

Start carafate 1g 4 times per day.  Continue protonix 40mg IV twice daily.  GI 

consulted, Dr. Barreto evaluated pt and feels her sypmtoms are related to marijuana

-induced cyclical vomiting and he does not feel repeat EGD is necessary at this 

point.  Repeat labs q am with possible discharge if tolerating clear liquids.  

   





(2) Acid reflux


Current Visit: No   Status: Acute   Base Code: K21.9 - GASTRO-ESOPHAGEAL REFLUX 

DISEASE WITHOUT ESOPHAGITIS   Comment: 11/20/17: continue protonix 40mg IV 

twice daily and carafate 1 gm QID prn.  Ofirmev 1g IV q6 hours for epigastric 

pain.       





(3) Anxiety


Current Visit: Yes   Status: Acute   Base Code: F41.9 - ANXIETY DISORDER, 

UNSPECIFIED   Comment: 11/20/17- Pt. reports long history of anxiety, 

previously treated by prior PCP but she lost her insurance.  She used to take 

ativan 1mg daily and she has never been on SSRI therapy.  We will try vistiril 

50mg PO q6 hours prn for anxiety.     





(4) DVT prophylaxis


Current Visit: Yes   Status: Acute   Base Code: [id number] -    Comment: 11/20/17-

  Pt. is low risk, however, has limited mobility at this time   





(5) Full code status


Current Visit: No   Status: Acute   Base Code: Z78.9 - OTHER SPECIFIED HEALTH 

STATUS   Comment: 11/20/17: pt remains full code

## 2017-11-21 LAB
ALBUMIN SERPL-MCNC: 3.8 G/DL (ref 4–5)
ALBUMIN/GLOB SERPL: 1.8 {RATIO} (ref 1.1–1.8)
ALP SERPL-CCNC: 42 U/L (ref 35–104)
ALT SERPL-CCNC: 13 U/L (ref ?–33)
ANION GAP SERPL CALC-SCNC: 16 MMOL/L (ref 7–16)
AST SERPL-CCNC: 11 U/L (ref 10–35)
BILIRUB SERPL-MCNC: 1.1 MG/DL (ref 0.2–1)
BUN SERPL-MCNC: 6 MG/DL (ref 6–20)
CO2 SERPL-SCNC: 22 MMOL/L (ref 22–29)
CREAT SERPL-MCNC: 0.6 MG/DL (ref 0.5–0.9)
EST GLOMERULAR FILTRATION RATE: > 60 ML/MIN
GLOBULIN SER-MCNC: 2.1 GM/DL (ref 1.4–4.8)
GLUCOSE SERPL-MCNC: 90 MG/DL (ref 74–109)
PROT SERPL-MCNC: 5.9 G/DL (ref 6.6–8.7)

## 2017-11-21 RX ADMIN — SODIUM CHLORIDE SCH MLS/HR: 9 INJECTION, SOLUTION INTRAVENOUS at 10:29

## 2017-11-21 RX ADMIN — SUCRALFATE SCH: 1 SUSPENSION ORAL at 17:35

## 2017-11-21 RX ADMIN — SODIUM CHLORIDE PRN MLS/HR: 0.9 INJECTION, SOLUTION INTRAVENOUS at 10:20

## 2017-11-21 RX ADMIN — SODIUM CHLORIDE SCH: 9 INJECTION, SOLUTION INTRAVENOUS at 06:25

## 2017-11-21 RX ADMIN — ENOXAPARIN SODIUM SCH MG: 40 INJECTION SUBCUTANEOUS at 10:21

## 2017-11-21 RX ADMIN — ONDANSETRON PRN MG: 2 INJECTION INTRAMUSCULAR; INTRAVENOUS at 10:28

## 2017-11-21 RX ADMIN — LORAZEPAM PRN MG: 2 INJECTION INTRAMUSCULAR; INTRAVENOUS at 03:21

## 2017-11-21 RX ADMIN — LORAZEPAM PRN MG: 2 INJECTION INTRAMUSCULAR; INTRAVENOUS at 11:28

## 2017-11-21 RX ADMIN — PANTOPRAZOLE SODIUM SCH MG: 40 INJECTION, POWDER, FOR SOLUTION INTRAVENOUS at 10:23

## 2017-11-21 RX ADMIN — LORAZEPAM PRN MG: 2 INJECTION INTRAMUSCULAR; INTRAVENOUS at 10:49

## 2017-11-21 RX ADMIN — SUCRALFATE SCH G: 1 SUSPENSION ORAL at 10:21

## 2017-11-21 RX ADMIN — ONDANSETRON PRN MG: 2 INJECTION INTRAMUSCULAR; INTRAVENOUS at 03:21

## 2017-11-21 NOTE — MEDICAL RECORDS CONSULT
DATE OF CONSULTATION: 11/20/2017 



REASON FOR CONSULTATION: Recurring vomiting. 



HISTORY OF PRESENT ILLNESS: The patient is a pleasant 37-year-old  female seen at the 
request of Maday Thorpe for evaluation of recurring nausea and vomiting which began 1 day ago. 
She has had a previous history of the same and, in fact, had consultation and upper endoscopy 
completed by my associate Dr. Irving in July of this year. At that time, it was thought she had 
cyclic vomiting related to THC. Upper endoscopy revealed distal esophageal ulcerations with 
unremarkable gastric biopsies taken. The patient states that she does better when she takes a proton 
pump inhibitor on a daily basis but has not taken it for the past few days. She states that she 
smokes marijuana regularly and it sounds like a minimum of 2 marijuana cigarettes daily. At times 
she might smoke more. She admits to taking multiple hot showers which helps her feel better. She 
also complains of epigastric discomfort. She smokes on a daily basis. She denies alcohol consumption 
stating she quit in June of this year. She admits to, as mentioned, heavy marijuana usage. 
Additionally, she had a Kathy-Anthony tear with hyperemesis gravidarum during pregnancy in the past. 
She denies any travel within the last month. She denies any significant family medical history. 



REVIEW OF SYSTEMS: Noted in the medical record and reviewed. 



PHYSICAL EXAMINATION: 

GENERAL: She is quite lethargic, having been medicated with Ativan for anxiety. She is responsive. 
She thought she was feeling a bit better today than over the time of admission. 

HEART: Regular. 

LUNGS: Clear bilaterally. 

ABDOMEN: Soft and without tenderness. 

EXTREMITIES: Free from edema. 

NEUROMUSCULAR: Examination seems to be grossly unremarkable. 



LABORATORY DATA: Normal CBC. Her comprehensive metabolic panel was also unremarkable. Serum lipase 
was 38. Pregnancy test was negative. 



RADIOGRAPHIC DATA: CT scan demonstrated some thickening of the distal esophagus likely due to 
under-distention. No acute process was noted. 



IMPRESSIONS: 

1. Your patient is suffering with cyclic vomiting syndrome at least in part related to chronic 
marijuana usage. The patient's nurse relates that the patient might take showers lasting 3 hours at 
a time. 

2. She has chronic gastroesophageal reflux disease with history of esophageal ulcerations noted in 
July of this year. She was improved with the use of proton pump inhibitor therapy but stopped this 
medication 4 days ago. 



RECOMMENDATIONS: 

1. At this point, I would continue acid blockade therapy. She can be offered clear liquids and her 
diet advanced as tolerated. 

2. There are some clinical trials suggesting that the use of Elavil might help with cyclic vomiting 
syndrome, but we will leave this up to primary care team to offer patient if they want. 



Thank you for allowing me to participate in her care. Should you have any questions in reference to 
this consultation, Please do not hesitate to call me. 



Note: My impressions and recommendations were discussed with the primary care team at this time. CC: 
Maday ROYAL

## 2017-11-21 NOTE — DISCHARGE SUMMARY
Providers


Discharge Summary Date: 11/21/17


Date of admission: 


11/20/17 04:54





Attending physician: 


Michael Banks








Physical Exam





- Vital Signs


Vital Signs: 


 Vital Signs - Last 24 Hrs











  Temp Pulse Resp BP Pulse Ox


 


 11/21/17 09:00   74  20  


 


 11/21/17 08:30  98.2 F  74  20  118/73 


 


 11/21/17 05:00  97.9 F  74  18  154/96  99


 


 11/20/17 21:00  98.1 F  64  18  152/79  100














- General


General Appearance: Alert, Oriented x3, Cooperative, Mild distress, Anxious, 

Other (Patient appears very anxious on examination, repeating "I need something 

for my pain" over and over, does exhibit dry heaves on examination without 

producing any emesis.)


Limitations: No limitations





- Head


Head exam: Atraumatic, Normocephalic, Normal inspection


Head exam detail: negative: Abrasion, Contusion, Cannon's sign, General 

tenderness, Hematoma, Laceration





- Eye


Eye exam: Normal appearance.  negative: Conjunctival injection, Periorbital 

swelling, Periorbital tenderness, Scleral icterus





- ENT


Ear exam: negative: Auricular hematoma, Auricular trauma


Nasal Exam: negative: Active bleeding, Discharge, Dried blood, Foreign body


Mouth exam: negative: Drooling, Laceration, Muffled voice, Tongue elevation





- Neck


Neck exam: Normal inspection.  negative: Meningismus, Tenderness





- Respiratory


Respiratory exam: Normal lung sounds bilaterally.  negative: Rales, Respiratory 

distress, Rhonchi, Stridor





- Cardiovascular


Cardiovascular Exam: Regular rate, Normal rhythm, Normal heart sounds


Peripheral Pulses: 2+: Radial (R), Radial (L), Dorsalis Pedis (R), Dorsalis 

Pedis (L)





- GI/Abdominal


GI/Abdominal exam: Soft, Normal bowel sounds, Tenderness.  negative: Rebound, 

Rigid (with palpation of epigastric area)





- Rectal


Rectal exam: Deferred





- 


 exam: Deferred





- Extremities


Extremities exam: Normal inspection.  negative: Calf tenderness, Pedal edema, 

Tenderness





- Back


Back exam: Denies: CVA tenderness (R), CVA tenderness (L)





- Neurological


Neurological exam: Alert, Oriented X3





- Psychiatric


Psychiatric exam: Anxious





- Skin


Skin exam: Normal color.  negative: Abrasion


Type of lesion: negative: abrasion





Hospitalization





- Hospitalization


Admission Diagnosis: Intractable nausea.  Cyclic vomiting by history





- Problem List/Discharge Diagnosis


(1) Intractable cyclical vomiting with nausea


Status: Acute   Base Code: G43.A1 - CYCLICAL VOMITING, INTRACTABLE   Comment: 11 /20/17-  Advanace diet to clear liquids, continue 0.9% NaCl at 125ml/hour, 

zofran 4mg IV q4 hours prn, and phenergen 25mg q6 hours.  Start carafate 1g 4 

times per day.  Continue protonix 40mg IV twice daily.  GI consulted, Dr. Barreto 

evaluated pt and feels her sypmtoms are related to marijuana-induced cyclical 

vomiting and he does not feel repeat EGD is necessary at this point.  Repeat 

labs q am with possible discharge if tolerating clear liquids.     





(2) Acid reflux


Status: Acute   Base Code: K21.9 - GASTRO-ESOPHAGEAL REFLUX DISEASE WITHOUT 

ESOPHAGITIS   Comment: 11/20/17: continue protonix 40mg IV twice daily and 

carafate 1 gm QID prn.  Ofirmev 1g IV q6 hours for epigastric pain.       





(3) Anxiety


Status: Acute   Base Code: F41.9 - ANXIETY DISORDER, UNSPECIFIED   Comment: 11/ 20/17- Pt. reports long history of anxiety, previously treated by prior PCP but 

she lost her insurance.  She used to take ativan 1mg daily and she has never 

been on SSRI therapy.  We will try vistiril 50mg PO q6 hours prn for anxiety.  

   





- Hospitalization Course


Disposition: Home, Self-Care


Hospital Course: 





38 y/o female with severe nausea and vomiting which began 2 days ago. The 

patient notes that initially her vomitous was clear but hen she began noticing 

streaks of blood. She also describes abdominal pain 10/10 in severity without 

radiation. She has history of cyclical vomiting with previous admissions for 

the same and has been on Prilosec which she says helps but she has not been 

able to take because her medication is in her car which broke down in 

[place]. She smokes marajauna daily and denies fever, chills, diarrhea, 

constipation, loss of appetite or weight changes. 





11/20 - started on Zofran, valium and IV pain medication. Patient made NPO and 

labs ordered which did not show any remarkable changes. GI recommends d/c of 

marajuana as it is considered the trigger for her symptoms. Patient still 

describes nausea but no vomiting. She has taken multiple showers throughout the 

day to soothe symptoms. Exhibits pain seeking behavior and asking for more pain 

medication. 





11/21 - Patient complains of sleep disturbance and is given Ativan 0.5mg IV 

which she says is not helping. She continues to complain of nausea but is able 

to tolerate advancement of diet to full liquids. Administered an additional 

dose of Ativan 0.5mg and patient was able to sleep comfortably throughout the 

day. She was able to tolerate full diet in the afternoon without the need for 

pain medication of antiemetics. Discussed the need to d/c cannabis use and to 

continue taking PPIs as prescribed. SW discussed outpatient follow up regarding 

PCP care. 


Abnormal Labs: 


 Abnormal Lab Results











  11/20/17 11/21/17 Range/Units





  05:23 06:09 


 


Calcium   8.3 L  (8.6-10.0)  mg/dL


 


Total Bilirubin   1.10 H  (0.2-1.0)  mg/dL


 


Total Protein   5.9 L  (6.6-8.7)  g/dL


 


Albumin   3.8 L  (4.0-5.0)  g/dL


 


Urine Ketones  80 mg/dl H   (NEGATIVE)  











Condition at Discharge: (2) Stable





Discharge Plan





- Discharge Instructions


Instructions:  Sucralfate (By mouth), Lorazepam (By mouth), Omeprazole (By mouth

), Ondansetron (By mouth), Acute Nausea and Vomiting (DC)


Additional Instructions: 


A Community Health Worker from [facility] to [facility] will be contacting you 

to assist with medicaid enrollment and to connect you with medical and 

behavioral health resources.





Contact [facility] Crisis Services 24/7 at [phone] if 

experiencing a mental health crisis.  The Crisis Services Department provides 

evaluation and screening for for persons with Medicaid or those who are 

uninsured.





You can also contact [facility] 24 Hour Crisis Line at [phone]





Scripts have been written and to be scanned into chart.


Ativan 0.5mg 4 tabs PO PRN 


Zofran 4mg Q4H PRN 10 tabs 


Omeprazole 20mg Q12H, 30 tabs 


Carafate 1gm PO QID





Follow up with Primary Care Physician in one week. 





Quality Measures





- Quality Measures


Quality Measures: Documentation of Current Medications in Medical Record, 

Screening for High Blood Pressure and F/U Documented





- Current Medications


Quality Measure: Measure #130: Documentation of Current Medications


Documentation of Current Medications: <Current Medications Documented/Reviewed> 

[]





- Blood Pressure Screening


Quality Measure: Screening for High Blood Pressure and Follow-Up Documented


Does Patient Have Any of the Following: No


Blood Pressure Classification: Hypertensive Reading


Systolic Measurement: 144


Diastolic Measurement: 97


Screening for High Blood Pressure: < Normal BP, F/U Not Required > []





- Elder Abuse Suspicion Index


EASI Reference Information: Carina GARDNER, Rubia C, Raj D, Cadence LONDON.Development and validation of a tool to assist physicians identification of 

elder abuse: The Elder Abuse Suspicion  Index (EASI ).  Journal of Elder Abuse 

and Neglect, 2008; 20 (3): 276-300.

## 2018-05-18 ENCOUNTER — HOSPITAL ENCOUNTER (EMERGENCY)
Dept: HOSPITAL 59 - ER | Age: 38
Discharge: HOME | End: 2018-05-18
Payer: SELF-PAY

## 2018-05-18 DIAGNOSIS — Z20.2: ICD-10-CM

## 2018-05-18 DIAGNOSIS — N76.0: Primary | ICD-10-CM

## 2018-05-18 DIAGNOSIS — F17.210: ICD-10-CM

## 2018-05-18 LAB
APPEARANCE UR: CLEAR
BILIRUB UR-MCNC: NEGATIVE MG/DL
COLOR UR: YELLOW
GLUCOSE UR STRIP-MCNC: NEGATIVE MG/DL
HCG,QUALITATIVE URINE: NEGATIVE
KETONES UR QL STRIP: NEGATIVE
NITRITE UR QL STRIP: NEGATIVE
PROT UR QL STRIP: NEGATIVE
RBC # UR STRIP: NEGATIVE /UL
URINE LEUKOCYTE ESTERASE: NEGATIVE
UROBILINOGEN UR STRIP-ACNC: 0.2 E.U./DL (ref 0.2–1)

## 2018-05-18 PROCEDURE — 87210 SMEAR WET MOUNT SALINE/INK: CPT

## 2018-05-18 PROCEDURE — 81025 URINE PREGNANCY TEST: CPT

## 2018-05-18 PROCEDURE — 81003 URINALYSIS AUTO W/O SCOPE: CPT

## 2018-05-18 PROCEDURE — 99283 EMERGENCY DEPT VISIT LOW MDM: CPT

## 2018-11-03 ENCOUNTER — HOSPITAL ENCOUNTER (EMERGENCY)
Dept: HOSPITAL 59 - ER | Age: 38
Discharge: HOME | End: 2018-11-03
Payer: MEDICAID

## 2018-11-03 DIAGNOSIS — N30.00: ICD-10-CM

## 2018-11-03 DIAGNOSIS — F17.210: ICD-10-CM

## 2018-11-03 DIAGNOSIS — N72: Primary | ICD-10-CM

## 2018-11-03 LAB
APPEARANCE UR: (no result)
BACTERIA #/AREA URNS HPF: (no result) /[HPF]
BILIRUB UR-MCNC: (no result) MG/DL
COLOR UR: (no result)
EPI CELLS #/AREA URNS HPF: (no result) /[HPF]
KETONES UR QL STRIP: (no result)
NITRITE UR QL STRIP: POSITIVE
PROT UR QL STRIP: (no result)
RBC # UR STRIP: (no result) /UL
RBC #/AREA URNS HPF: (no result) /[HPF]
URINE LEUKOCYTE ESTERASE: (no result)
UROBILINOGEN UR STRIP-ACNC: >=8 E.U./DL (ref 0.2–1)

## 2018-11-03 PROCEDURE — 81001 URINALYSIS AUTO W/SCOPE: CPT

## 2018-11-03 PROCEDURE — 87210 SMEAR WET MOUNT SALINE/INK: CPT

## 2018-11-03 PROCEDURE — 99284 EMERGENCY DEPT VISIT MOD MDM: CPT

## 2018-11-03 NOTE — EMERGENCY DEPARTMENT RECORD
History of Present Illness





- General


Chief complaint: Female Urogenital Problem


Stated complaint: UIT


Time Seen by Provider: 11/03/18 18:30


Source: Patient


Mode of Arrival: Ambulatory


Limitations: No limitations





- History of Present Illness


Initial comments: 





37 yo female presents to ED for evaluation of urinary frequency, burning, and 

urgency symptoms that began this morning.  Patient reports "I think I have a UTI

", denies fevers, chills, nausea, vomiting, or flank pain symptoms.  Patient 

denies health problems at her baseline.


MD Complaint: Dysuria


Onset/Timing: 10


-: Hour(s)


Severity: Mild


Severity scale (1-10): 3


Quality: Burning


Consistency: Constant


Improves with: None


Worsens with: Urination


Associated Symptoms: Dysuria





- Related Data


Sexually active: Yes


 Previous Rx's











 Medication  Instructions  Recorded


 


Nitrofurantoin Monohyd/M-Cryst 100 mg PO BID #13 capsule 11/03/18





[Macrobid 100 mg Capsule]  











 Allergies











Allergy/AdvReac Type Severity Reaction Status Date / Time


 


cefaclor [From [facility]] Allergy Severe HIVES Verified 05/18/18 16:48














Travel Screening





- Travel/Exposure Within Last 30 Days


Have you traveled within the last 30 days?: No





Review of Systems


Constitutional: Denies: Chills, Fever, Malaise, Night sweats


Eyes: Denies: Eye discharge, Eye pain


ENT: Denies: Congestion, Ear pain, Epistaxis


Respiratory: Denies: Cough, Dyspnea


Cardiovascular: Denies: Chest pain, Dyspnea on exertion


Endocrine: Denies: Fatigue, Heat or cold intolerance


Gastrointestinal: Denies: Abdominal pain, Nausea, Vomiting


Genitourinary: Reports: Dysuria, Frequency.  Denies: Incontinence, Retention


Musculoskeletal: Denies: Arthralgia, Back pain, Gout, Joint swelling


Skin: Denies: Bruising, Change in color


Neurological: Denies: Abnormal gait, Confusion, Headache


Psychiatric: Denies: Anxiety


Hematological/Lymphatic: Denies: Anemia, Blood Clots





Past Medical History





- SOCIAL HISTORY


Smoking Status: Current every day smoker





- RESPIRATORY


Hx Respiratory Disorders: No





- CARDIOVASCULAR


Hx Cardio Disorders: No





- NEURO


Hx Neuro Disorders: No





- GI


Hx GI Disorders: Yes


Hx Abdominal Pain: Yes


Hx Reflux: Yes


Hx Nausea/Vomiting: Yes (cyclical vomiting syndrome)


Hx Ulcer: Yes (Gastric Ulcer 6/2017)


Comment:: hx of a tiffanie wise tear w/ hyperemesis gravidarum, ulcerative 

esophagitis





- 


Hx Genitourinary Disorders: No





- ENDOCRINE


Hx Endocrine Disorders: No





- MUSCULOSKELETAL


Hx Musculoskeletal Disorders: No





- PSYCH


Hx Psych Problems: No





- HEMATOLOGY/ONCOLOGY


Hx Hematology/Oncology Disorders: No





Family Medical History


Any Significant Family History?: No





Physical Exam





- General


General Appearance: Alert, Oriented x3, Cooperative, Mild distress


Limitations: No limitations





- Head


Head exam: Atraumatic, Normocephalic, Normal inspection


Head exam detail: negative: Abrasion, Contusion, Cannon's sign, General 

tenderness, Hematoma, Laceration





- Eye


Eye exam: Normal appearance.  negative: Conjunctival injection, Periorbital 

swelling, Periorbital tenderness, Scleral icterus





- ENT


Ear exam: negative: Auricular hematoma, Auricular trauma


Nasal Exam: negative: Active bleeding, Discharge, Dried blood, Foreign body


Mouth exam: negative: Drooling, Laceration, Muffled voice, Tongue elevation





- Neck


Neck exam: Normal inspection.  negative: Meningismus, Tenderness





- Respiratory


Respiratory exam: Normal lung sounds bilaterally.  negative: Respiratory 

distress, Rhonchi, Stridor





- Cardiovascular


Cardiovascular Exam: Regular rate, Normal rhythm, Normal heart sounds





- GI/Abdominal


GI/Abdominal exam: Soft.  negative: Distended, Rebound, Rigid, Tenderness





- Rectal


Rectal exam: Deferred





- 


 exam: Other (MIld friability to the cervix is present, nis significant 

discharge is present)





- Extremities


Extremities exam: Normal inspection.  negative: Calf tenderness, Pedal edema, 

Tenderness





- Back


Back exam: Denies: CVA tenderness (R), CVA tenderness (L)





- Neurological


Neurological exam: Alert, Normal gait, Oriented X3





- Psychiatric


Psychiatric exam: Normal affect, Normal mood





- Skin


Skin exam: Normal color.  negative: Abrasion


Type of lesion: negative: abrasion





Course





 Vital Signs











  11/03/18





  18:31


 


Temperature 98.6 F


 


Pulse Rate [ 99 H





Pulse Ox Probe] 


 


Respiratory 16





Rate 


 


Blood Pressure 134/103





[Left Arm] 


 


Pulse Ox 99














- Reevaluation(s)


Reevaluation #1: 





11/03/18 18:51


Urinalysis was reviewed:


0-2 RBCs


10-15 WBCs


No bacteria





Patient was counsled re: urinalysis results, is requesting pelvic examination 

to exclude chlaydia as a source of her dysuria symptoms.


Will perform pelvic examination when set -up.





Reevaluation #2: 





11/03/18 19:41


Wet prep was reviewed and appears negative for trichomonas or yeast.


Will treat with Zithromax 2 grams x 1 for possible cervicitis, them Macrobid 

BID for 7 days for early UTI/dysuria coverage.


Patient agrees with the plan of care and appears stable for discharge at this 

time.





Disposition


Disposition: Discharge


Clinical Impression: 


 Cervicitis





Acute cystitis


Qualifiers:


 Hematuria presence: without hematuria Qualified Code(s): N30.00 - Acute 

cystitis without hematuria





Disposition: Home, Self-Care


Condition: (2) Stable


Instructions:  Urinary Tract Infection in Women (ED)


Additional Instructions: 


Return to ED if your symptoms worsen or if you have any concerns.


Macrobid as directed.


Follow-up with your family doctor in 3-5 days as directed.


Prescriptions: 


Nitrofurantoin Monohyd/M-Cryst [Macrobid 100 mg Capsule] 100 mg PO BID #13 

capsule


Forms:  Patient Portal Access


Time of Disposition: 19:44





Quality





- Quality Measures


Quality Measures: N/A





- Blood Pressure Screening


Does Patient Have Any of the Following: No


Blood Pressure Classification: Pre-Hypertensive BP Reading


Systolic Measurement: 120


Diastolic Measurement: 89


Screening for High Blood Pressure: < Pre-Hypertensive BP, F/U Documented > [

]


Pre-Hypertensive Follow-up Interventions: Referral to alternative/primary care 

provider.